# Patient Record
Sex: FEMALE | Race: WHITE | URBAN - METROPOLITAN AREA
[De-identification: names, ages, dates, MRNs, and addresses within clinical notes are randomized per-mention and may not be internally consistent; named-entity substitution may affect disease eponyms.]

---

## 2022-11-10 ENCOUNTER — OFFICE VISIT (OUTPATIENT)
Dept: HEMATOLOGY/ONCOLOGY | Facility: HOSPITAL | Age: 52
End: 2022-11-10
Attending: INTERNAL MEDICINE
Payer: COMMERCIAL

## 2022-11-10 VITALS
SYSTOLIC BLOOD PRESSURE: 135 MMHG | HEART RATE: 73 BPM | RESPIRATION RATE: 16 BRPM | DIASTOLIC BLOOD PRESSURE: 79 MMHG | OXYGEN SATURATION: 98 % | TEMPERATURE: 98 F | WEIGHT: 132.63 LBS

## 2022-11-10 DIAGNOSIS — R20.0 EXTREMITY NUMBNESS: ICD-10-CM

## 2022-11-10 DIAGNOSIS — E61.1 IRON DEFICIENCY: ICD-10-CM

## 2022-11-10 DIAGNOSIS — D69.6 THROMBOCYTOPENIA (HCC): ICD-10-CM

## 2022-11-10 DIAGNOSIS — D05.12 BREAST NEOPLASM, TIS (DCIS), LEFT: ICD-10-CM

## 2022-11-10 DIAGNOSIS — R42 DIZZINESS AFTER EXTENSION OF NECK: ICD-10-CM

## 2022-11-10 DIAGNOSIS — D70.8 OTHER NEUTROPENIA (HCC): Primary | ICD-10-CM

## 2022-11-10 DIAGNOSIS — R21 RASH IN ADULT: ICD-10-CM

## 2022-11-10 LAB
BASOPHILS # BLD AUTO: 0.04 X10(3) UL (ref 0–0.2)
BASOPHILS NFR BLD AUTO: 0.9 %
DEPRECATED HBV CORE AB SER IA-ACNC: 183.8 NG/ML
EOSINOPHIL # BLD AUTO: 0.11 X10(3) UL (ref 0–0.7)
EOSINOPHIL NFR BLD AUTO: 2.6 %
ERYTHROCYTE [DISTWIDTH] IN BLOOD BY AUTOMATED COUNT: 11.9 %
HCT VFR BLD AUTO: 39.8 %
HGB BLD-MCNC: 13.1 G/DL
IMM GRANULOCYTES # BLD AUTO: 0.01 X10(3) UL (ref 0–1)
IMM GRANULOCYTES NFR BLD: 0.2 %
LDH SERPL L TO P-CCNC: 148 U/L
LYMPHOCYTES # BLD AUTO: 1.08 X10(3) UL (ref 1–4)
LYMPHOCYTES NFR BLD AUTO: 25.4 %
MCH RBC QN AUTO: 32.5 PG (ref 26–34)
MCHC RBC AUTO-ENTMCNC: 32.9 G/DL (ref 31–37)
MCV RBC AUTO: 98.8 FL
MONOCYTES # BLD AUTO: 0.39 X10(3) UL (ref 0.1–1)
MONOCYTES NFR BLD AUTO: 9.2 %
NEUTROPHILS # BLD AUTO: 2.62 X10 (3) UL (ref 1.5–7.7)
NEUTROPHILS # BLD AUTO: 2.62 X10(3) UL (ref 1.5–7.7)
NEUTROPHILS NFR BLD AUTO: 61.7 %
PLATELET # BLD AUTO: 159.5 10(3)UL (ref 150–450)
PLATELET # BLD AUTO: 174 10(3)UL (ref 150–450)
RBC # BLD AUTO: 4.03 X10(6)UL
RHEUMATOID FACT SERPL-ACNC: <10 IU/ML (ref ?–15)
WBC # BLD AUTO: 4.3 X10(3) UL (ref 4–11)

## 2022-11-10 PROCEDURE — 84165 PROTEIN E-PHORESIS SERUM: CPT | Performed by: INTERNAL MEDICINE

## 2022-11-10 PROCEDURE — 99211 OFF/OP EST MAY X REQ PHY/QHP: CPT

## 2022-11-10 PROCEDURE — 83521 IG LIGHT CHAINS FREE EACH: CPT | Performed by: INTERNAL MEDICINE

## 2022-11-10 PROCEDURE — 83615 LACTATE (LD) (LDH) ENZYME: CPT | Performed by: INTERNAL MEDICINE

## 2022-11-10 PROCEDURE — 36415 COLL VENOUS BLD VENIPUNCTURE: CPT

## 2022-11-10 PROCEDURE — 86334 IMMUNOFIX E-PHORESIS SERUM: CPT | Performed by: INTERNAL MEDICINE

## 2022-11-10 PROCEDURE — 85025 COMPLETE CBC W/AUTO DIFF WBC: CPT | Performed by: INTERNAL MEDICINE

## 2022-11-10 PROCEDURE — 82525 ASSAY OF COPPER: CPT | Performed by: INTERNAL MEDICINE

## 2022-11-10 PROCEDURE — 86225 DNA ANTIBODY NATIVE: CPT | Performed by: INTERNAL MEDICINE

## 2022-11-10 PROCEDURE — 86038 ANTINUCLEAR ANTIBODIES: CPT | Performed by: INTERNAL MEDICINE

## 2022-11-10 PROCEDURE — 82728 ASSAY OF FERRITIN: CPT | Performed by: INTERNAL MEDICINE

## 2022-11-10 PROCEDURE — 86431 RHEUMATOID FACTOR QUANT: CPT | Performed by: INTERNAL MEDICINE

## 2022-11-10 PROCEDURE — 83921 ORGANIC ACID SINGLE QUANT: CPT | Performed by: INTERNAL MEDICINE

## 2022-11-10 RX ORDER — ALPRAZOLAM 0.5 MG/1
0.5 TABLET ORAL NIGHTLY PRN
COMMUNITY

## 2022-11-10 RX ORDER — FERROUS SULFATE TAB EC 324 MG (65 MG FE EQUIVALENT) 324 (65 FE) MG
TABLET DELAYED RESPONSE ORAL
COMMUNITY

## 2022-11-10 NOTE — PATIENT INSTRUCTIONS
For triage nurse: [phone] Monday through Friday 7:30-5:00.  *Please note this is a new phone number*    After hours or weekends for emergent needs:  [phone].      To schedule diagnostic testing: Central [phone]    For Medical Records: [phone]

## 2022-11-11 LAB
DSDNA IGG SERPL IA-ACNC: 2.2 IU/ML
ENA AB SER QL IA: <0.09 UG/L
ENA AB SER QL IA: NEGATIVE

## 2022-11-12 LAB — COPPER, SERUM: 110.3 UG/DL

## 2022-11-15 LAB
ALBUMIN SERPL ELPH-MCNC: 4.32 G/DL (ref 3.75–5.21)
ALBUMIN/GLOB SERPL: 1.89 {RATIO} (ref 1–2)
ALPHA1 GLOB SERPL ELPH-MCNC: 0.24 G/DL (ref 0.19–0.46)
ALPHA2 GLOB SERPL ELPH-MCNC: 0.65 G/DL (ref 0.48–1.05)
B-GLOBULIN SERPL ELPH-MCNC: 0.59 G/DL (ref 0.68–1.23)
GAMMA GLOB SERPL ELPH-MCNC: 0.79 G/DL (ref 0.62–1.7)
KAPPA LC FREE SER-MCNC: 1.64 MG/DL (ref 0.33–1.94)
KAPPA LC FREE/LAMBDA FREE SER NEPH: 1.36 {RATIO} (ref 0.26–1.65)
LAMBDA LC FREE SERPL-MCNC: 1.21 MG/DL (ref 0.57–2.63)
PROT SERPL-MCNC: 6.6 G/DL (ref 6.4–8.2)

## 2022-11-17 LAB — MMA: <0.1 UMOL/L

## 2023-01-19 ENCOUNTER — TELEPHONE (OUTPATIENT)
Dept: SURGERY | Facility: CLINIC | Age: 53
End: 2023-01-19

## 2023-01-19 NOTE — TELEPHONE ENCOUNTER
Spoke to patient regarding getting a consult with Alison Jamil. Patient is going to obtain all breast imaging and drop them off in office. Patient will be provided with appointment once records are received. Patient verbally understand.

## 2023-01-23 ENCOUNTER — OFFICE VISIT (OUTPATIENT)
Dept: FAMILY MEDICINE CLINIC | Facility: CLINIC | Age: 53
End: 2023-01-23
Payer: COMMERCIAL

## 2023-01-23 VITALS
WEIGHT: 136.19 LBS | DIASTOLIC BLOOD PRESSURE: 60 MMHG | OXYGEN SATURATION: 99 % | HEART RATE: 73 BPM | HEIGHT: 66.73 IN | SYSTOLIC BLOOD PRESSURE: 102 MMHG | RESPIRATION RATE: 16 BRPM | BODY MASS INDEX: 21.63 KG/M2

## 2023-01-23 DIAGNOSIS — R20.2 PARESTHESIAS IN RIGHT HAND: ICD-10-CM

## 2023-01-23 DIAGNOSIS — Z13.228 SCREENING FOR ENDOCRINE, NUTRITIONAL, METABOLIC AND IMMUNITY DISORDER: ICD-10-CM

## 2023-01-23 DIAGNOSIS — Z13.21 SCREENING FOR ENDOCRINE, NUTRITIONAL, METABOLIC AND IMMUNITY DISORDER: ICD-10-CM

## 2023-01-23 DIAGNOSIS — Z12.11 SCREENING FOR COLON CANCER: ICD-10-CM

## 2023-01-23 DIAGNOSIS — Z13.6 SCREENING FOR HEART DISEASE: ICD-10-CM

## 2023-01-23 DIAGNOSIS — Z13.29 SCREENING FOR ENDOCRINE, NUTRITIONAL, METABOLIC AND IMMUNITY DISORDER: ICD-10-CM

## 2023-01-23 DIAGNOSIS — R20.2 PARESTHESIA OF LEFT FOOT: ICD-10-CM

## 2023-01-23 DIAGNOSIS — Z00.00 ANNUAL PHYSICAL EXAM: Primary | ICD-10-CM

## 2023-01-23 DIAGNOSIS — D05.12 DUCTAL CARCINOMA IN SITU (DCIS) OF LEFT BREAST: ICD-10-CM

## 2023-01-23 DIAGNOSIS — F41.9 ANXIETY: ICD-10-CM

## 2023-01-23 DIAGNOSIS — Z13.0 SCREENING FOR ENDOCRINE, NUTRITIONAL, METABOLIC AND IMMUNITY DISORDER: ICD-10-CM

## 2023-01-23 DIAGNOSIS — D50.9 IRON DEFICIENCY ANEMIA, UNSPECIFIED IRON DEFICIENCY ANEMIA TYPE: ICD-10-CM

## 2023-01-23 PROBLEM — M51.9 LUMBAR DISC DISORDER: Status: ACTIVE | Noted: 2019-07-10

## 2023-01-23 PROBLEM — I87.303 IDIOPATHIC CHRONIC VENOUS HYPERTENSION OF BOTH LOWER EXTREMITIES WITHOUT COMPLICATIONS: Status: ACTIVE | Noted: 2019-07-10

## 2023-01-23 PROBLEM — R20.9 ABNORMAL SENSATION OF LEG: Status: ACTIVE | Noted: 2019-07-10

## 2023-01-23 PROCEDURE — 3008F BODY MASS INDEX DOCD: CPT | Performed by: FAMILY MEDICINE

## 2023-01-23 PROCEDURE — 99386 PREV VISIT NEW AGE 40-64: CPT | Performed by: FAMILY MEDICINE

## 2023-01-23 PROCEDURE — 3078F DIAST BP <80 MM HG: CPT | Performed by: FAMILY MEDICINE

## 2023-01-23 PROCEDURE — 3074F SYST BP LT 130 MM HG: CPT | Performed by: FAMILY MEDICINE

## 2023-01-23 RX ORDER — ALPRAZOLAM 0.25 MG/1
0.25 TABLET ORAL
COMMUNITY
End: 2023-01-23 | Stop reason: ALTCHOICE

## 2023-01-23 RX ORDER — OMEGA-3 FATTY ACIDS/FISH OIL 300-1000MG
200 CAPSULE ORAL EVERY 6 HOURS
COMMUNITY

## 2023-01-23 RX ORDER — PAROXETINE 10 MG/1
10 TABLET, FILM COATED ORAL EVERY MORNING
COMMUNITY
Start: 2022-07-15 | End: 2023-01-23 | Stop reason: ALTCHOICE

## 2023-01-23 RX ORDER — TRIAMCINOLONE ACETONIDE 1 MG/G
CREAM TOPICAL
COMMUNITY
Start: 2021-12-10 | End: 2023-01-23 | Stop reason: ALTCHOICE

## 2023-01-23 RX ORDER — ALPRAZOLAM 0.5 MG/1
0.5 TABLET ORAL
COMMUNITY
Start: 2022-07-15 | End: 2023-01-23 | Stop reason: ALTCHOICE

## 2023-01-23 RX ORDER — NITROFURANTOIN 25; 75 MG/1; MG/1
100 CAPSULE ORAL EVERY 12 HOURS
COMMUNITY
Start: 2022-09-25 | End: 2023-01-23 | Stop reason: ALTCHOICE

## 2023-01-23 RX ORDER — CLINDAMYCIN PHOSPHATE 10 UG/ML
60 LOTION TOPICAL 2 TIMES DAILY
COMMUNITY
Start: 2022-08-26 | End: 2023-01-23 | Stop reason: ALTCHOICE

## 2023-01-24 LAB
HPV: NOT DETECTED
THINPREP PAP: NEGATIVE

## 2023-01-25 ENCOUNTER — PATIENT MESSAGE (OUTPATIENT)
Dept: FAMILY MEDICINE CLINIC | Facility: CLINIC | Age: 53
End: 2023-01-25

## 2023-01-25 PROBLEM — D50.9 IRON DEFICIENCY ANEMIA: Status: ACTIVE | Noted: 2023-01-25

## 2023-01-25 PROBLEM — F41.9 ANXIETY: Status: ACTIVE | Noted: 2023-01-25

## 2023-01-25 PROBLEM — R20.9 ABNORMAL SENSATION OF LEG: Status: RESOLVED | Noted: 2019-07-10 | Resolved: 2023-01-25

## 2023-01-25 NOTE — TELEPHONE ENCOUNTER
From: Rashi Monique  To: Carmen Peña MD  Sent: 1/25/2023 8:09 AM CST  Subject: labs for physical    Hi, I was looking at my after visit summary to set up having my labs done, but it says \"done today. \" If I call for the lipid panel, etc. will they have an order in the system? We didn't do any labs at my appointment. Thanks.

## 2023-01-25 NOTE — TELEPHONE ENCOUNTER
From: Flora Montaño  Sent: 1/25/2023 12:25 PM CST  To: [facility] Clinical Staff  Subject: labs for physical    Thanks Marlee Michael. I also noticed that the notes say I have Paresthesia on the left foot, but it is my right foot.

## 2023-01-26 NOTE — TELEPHONE ENCOUNTER
From: Willow Chan  To: Magalis Kumari MD  Sent: 1/25/2023 8:09 AM CST  Subject: labs for physical    Hi, I was looking at my after visit summary to set up having my labs done, but it says \"done today. \" If I call for the lipid panel, etc. will they have an order in the system? We didn't do any labs at my appointment. Thanks.

## 2023-02-01 ENCOUNTER — E-VISIT (OUTPATIENT)
Dept: TELEHEALTH | Age: 53
End: 2023-02-01

## 2023-02-01 DIAGNOSIS — R39.9 UTI SYMPTOMS: Primary | ICD-10-CM

## 2023-02-01 LAB
ALBUMIN/GLOBULIN RATIO: 1.8 (CALC) (ref 1–2.5)
ALBUMIN: 4.4 G/DL (ref 3.6–5.1)
ALKALINE PHOSPHATASE: 77 U/L (ref 37–153)
ALT: 11 U/L (ref 6–29)
AST: 18 U/L (ref 10–35)
BILIRUBIN, TOTAL: 0.5 MG/DL (ref 0.2–1.2)
BUN: 16 MG/DL (ref 7–25)
CALCIUM: 10 MG/DL (ref 8.6–10.4)
CARBON DIOXIDE: 31 MMOL/L (ref 20–32)
CHLORIDE: 105 MMOL/L (ref 98–110)
CHOL/HDLC RATIO: 2.5 (CALC)
CHOLESTEROL, TOTAL: 217 MG/DL
CREATININE: 0.88 MG/DL (ref 0.5–1.03)
EGFR: 79 ML/MIN/1.73M2
GLOBULIN: 2.4 G/DL (CALC) (ref 1.9–3.7)
GLUCOSE: 90 MG/DL (ref 65–99)
HDL CHOLESTEROL: 86 MG/DL
LDL-CHOLESTEROL: 113 MG/DL (CALC)
NON-HDL CHOLESTEROL: 131 MG/DL (CALC)
POTASSIUM: 5 MMOL/L (ref 3.5–5.3)
PROTEIN, TOTAL: 6.8 G/DL (ref 6.1–8.1)
SODIUM: 142 MMOL/L (ref 135–146)
TRIGLYCERIDES: 84 MG/DL
TSH W/REFLEX TO FT4: 3.4 MIU/L

## 2023-02-01 PROCEDURE — 99421 OL DIG E/M SVC 5-10 MIN: CPT | Performed by: NURSE PRACTITIONER

## 2023-02-01 RX ORDER — NITROFURANTOIN 25; 75 MG/1; MG/1
CAPSULE ORAL
Qty: 14 CAPSULE | Refills: 0 | Status: SHIPPED
Start: 2023-02-01

## 2023-02-01 RX ORDER — PHENAZOPYRIDINE HYDROCHLORIDE 200 MG/1
TABLET, FILM COATED ORAL
Qty: 6 TABLET | Refills: 0 | Status: SHIPPED | OUTPATIENT
Start: 2023-02-01

## 2023-02-02 NOTE — PROGRESS NOTES
Refer to patient Questionnaire and responses. See MyChart messages. 10 minutes spent in direct communication with patient via [facility].

## 2023-02-24 ENCOUNTER — TELEPHONE (OUTPATIENT)
Dept: FAMILY MEDICINE CLINIC | Facility: CLINIC | Age: 53
End: 2023-02-24

## 2023-02-24 ENCOUNTER — OFFICE VISIT (OUTPATIENT)
Dept: FAMILY MEDICINE CLINIC | Facility: CLINIC | Age: 53
End: 2023-02-24
Payer: COMMERCIAL

## 2023-02-24 VITALS
WEIGHT: 132 LBS | SYSTOLIC BLOOD PRESSURE: 110 MMHG | OXYGEN SATURATION: 97 % | DIASTOLIC BLOOD PRESSURE: 70 MMHG | HEIGHT: 68 IN | HEART RATE: 75 BPM | BODY MASS INDEX: 20 KG/M2 | TEMPERATURE: 99 F | RESPIRATION RATE: 16 BRPM

## 2023-02-24 DIAGNOSIS — R39.9 UTI SYMPTOMS: Primary | ICD-10-CM

## 2023-02-24 LAB
APPEARANCE: CLEAR
BILIRUBIN: NEGATIVE
GLUCOSE (URINE DIPSTICK): NEGATIVE MG/DL
KETONES (URINE DIPSTICK): NEGATIVE MG/DL
LEUKOCYTES: NEGATIVE
MULTISTIX LOT#: ABNORMAL NUMERIC
NITRITE, URINE: NEGATIVE
PH, URINE: 6 (ref 4.5–8)
PROTEIN (URINE DIPSTICK): NEGATIVE MG/DL
SPECIFIC GRAVITY: 1.01 (ref 1–1.03)
URINE-COLOR: YELLOW
UROBILINOGEN,SEMI-QN: 0.2 MG/DL (ref 0–1.9)

## 2023-02-24 PROCEDURE — 99213 OFFICE O/P EST LOW 20 MIN: CPT | Performed by: NURSE PRACTITIONER

## 2023-02-24 PROCEDURE — 3074F SYST BP LT 130 MM HG: CPT | Performed by: NURSE PRACTITIONER

## 2023-02-24 PROCEDURE — 87086 URINE CULTURE/COLONY COUNT: CPT | Performed by: NURSE PRACTITIONER

## 2023-02-24 PROCEDURE — 3078F DIAST BP <80 MM HG: CPT | Performed by: NURSE PRACTITIONER

## 2023-02-24 PROCEDURE — 3008F BODY MASS INDEX DOCD: CPT | Performed by: NURSE PRACTITIONER

## 2023-02-24 PROCEDURE — 81003 URINALYSIS AUTO W/O SCOPE: CPT | Performed by: NURSE PRACTITIONER

## 2023-02-24 RX ORDER — CIPROFLOXACIN 500 MG/1
500 TABLET, FILM COATED ORAL 2 TIMES DAILY
Qty: 10 TABLET | Refills: 0 | Status: SHIPPED | OUTPATIENT
Start: 2023-02-24 | End: 2023-03-01

## 2023-02-24 NOTE — PATIENT INSTRUCTIONS
Please start the antibiotic as discussed. We will send a urine culture and advise you if a change is needed in your antibiotic  Wipe from front to back after using the bathroom every time. Consider wet wipes for cleaning. Change your pad frequently during your menses. If sexually active urinate before and after sexual intercourse and wipe front to back. Stay well hydrated, wear cotton underwear to decrease the spread of E. Coli to the urethra. Cranberry extract may help make bacteria less likely to live in the bladder. Consider supplements during illness or regularly if frequent infections occur. Please follow up with your primary care provider if not improved despite treatment. Seek immediate care for worsening symptoms.

## 2023-02-24 NOTE — TELEPHONE ENCOUNTER
Pt called c/o possible UTI. Pt would like to be evaluated for possible medications. Advised pt to go to walk-in clinic to be evaluated today, pt agreeable.

## 2023-03-04 ENCOUNTER — OFFICE VISIT (OUTPATIENT)
Dept: FAMILY MEDICINE CLINIC | Facility: CLINIC | Age: 53
End: 2023-03-04
Payer: COMMERCIAL

## 2023-03-04 VITALS
OXYGEN SATURATION: 98 % | RESPIRATION RATE: 18 BRPM | HEIGHT: 68 IN | DIASTOLIC BLOOD PRESSURE: 82 MMHG | HEART RATE: 71 BPM | BODY MASS INDEX: 20 KG/M2 | SYSTOLIC BLOOD PRESSURE: 132 MMHG | TEMPERATURE: 97 F | WEIGHT: 132 LBS

## 2023-03-04 DIAGNOSIS — R39.9 UTI SYMPTOMS: Primary | ICD-10-CM

## 2023-03-04 LAB
APPEARANCE: CLEAR
BILIRUBIN: NEGATIVE
GLUCOSE (URINE DIPSTICK): NEGATIVE MG/DL
KETONES (URINE DIPSTICK): NEGATIVE MG/DL
MULTISTIX LOT#: ABNORMAL NUMERIC
NITRITE, URINE: NEGATIVE
PH, URINE: 7 (ref 4.5–8)
PROTEIN (URINE DIPSTICK): NEGATIVE MG/DL
SPECIFIC GRAVITY: 1.02 (ref 1–1.03)
URINE-COLOR: YELLOW
UROBILINOGEN,SEMI-QN: 0.2 MG/DL (ref 0–1.9)

## 2023-03-04 PROCEDURE — 87086 URINE CULTURE/COLONY COUNT: CPT | Performed by: NURSE PRACTITIONER

## 2023-03-04 RX ORDER — CEPHALEXIN 500 MG/1
500 CAPSULE ORAL 2 TIMES DAILY
Qty: 10 CAPSULE | Refills: 0 | Status: SHIPPED | OUTPATIENT
Start: 2023-03-04

## 2023-03-04 NOTE — PATIENT INSTRUCTIONS
Complete full course of antibiotics. Urine culture sent to lab  Over the counter Tylenol/Motrin as needed for pain/discomfort. Follow up in 2-3 days if symptoms do not improve or worsen. Return to clinic or see your primary care provider immediately if you experience nausea, vomiting, or fever. What can you do to help prevent bladder infections? Urinate often during the day. You should also urinate after you have sex. If you are a woman, it is important to:   Keep the area around your vagina clean. Wipe from front to back after you go to the bathroom. Gently wash the area around your vagina when you bathe or shower. Wear cotton underwear. Use pantyhose with cotton crotches. Avoid tight clothing. Wear loose pants. Do not wear a wet bathing suit for a long time.

## 2023-03-07 ENCOUNTER — TELEPHONE (OUTPATIENT)
Dept: FAMILY MEDICINE CLINIC | Facility: CLINIC | Age: 53
End: 2023-03-07

## 2023-03-07 NOTE — TELEPHONE ENCOUNTER
Pt c/o UTI symptoms. She has gone to walk-in clinic twice for frequency with urination and lower abdominal pain - cultures came back negative. Pt is taking Abx which she says is helping with symptoms, but then symptoms come back after the course is completed. Pt would like a sooner appointment with Dr. Marcelle Newby. Pt placed on waitlist for sooner appointment.

## 2023-03-08 ENCOUNTER — OFFICE VISIT (OUTPATIENT)
Dept: FAMILY MEDICINE CLINIC | Facility: CLINIC | Age: 53
End: 2023-03-08
Payer: COMMERCIAL

## 2023-03-08 VITALS
RESPIRATION RATE: 16 BRPM | SYSTOLIC BLOOD PRESSURE: 124 MMHG | BODY MASS INDEX: 20.97 KG/M2 | WEIGHT: 138.38 LBS | HEIGHT: 68 IN | HEART RATE: 77 BPM | DIASTOLIC BLOOD PRESSURE: 70 MMHG | OXYGEN SATURATION: 98 %

## 2023-03-08 DIAGNOSIS — N30.01 ACUTE CYSTITIS WITH HEMATURIA: Primary | ICD-10-CM

## 2023-03-08 DIAGNOSIS — R30.0 DYSURIA: ICD-10-CM

## 2023-03-08 LAB
APPEARANCE: CLEAR
BILIRUBIN: NEGATIVE
GLUCOSE (URINE DIPSTICK): NEGATIVE MG/DL
KETONES (URINE DIPSTICK): NEGATIVE MG/DL
LEUKOCYTES: NEGATIVE
MULTISTIX LOT#: ABNORMAL NUMERIC
NITRITE, URINE: NEGATIVE
PH, URINE: 6.5 (ref 4.5–8)
PROTEIN (URINE DIPSTICK): NEGATIVE MG/DL
SPECIFIC GRAVITY: 1.01 (ref 1–1.03)
URINE-COLOR: YELLOW
UROBILINOGEN,SEMI-QN: 0.2 MG/DL (ref 0–1.9)

## 2023-03-08 PROCEDURE — 3078F DIAST BP <80 MM HG: CPT | Performed by: FAMILY MEDICINE

## 2023-03-08 PROCEDURE — 87086 URINE CULTURE/COLONY COUNT: CPT | Performed by: FAMILY MEDICINE

## 2023-03-08 PROCEDURE — 81003 URINALYSIS AUTO W/O SCOPE: CPT | Performed by: FAMILY MEDICINE

## 2023-03-08 PROCEDURE — 99213 OFFICE O/P EST LOW 20 MIN: CPT | Performed by: FAMILY MEDICINE

## 2023-03-08 PROCEDURE — 3074F SYST BP LT 130 MM HG: CPT | Performed by: FAMILY MEDICINE

## 2023-03-08 PROCEDURE — 3008F BODY MASS INDEX DOCD: CPT | Performed by: FAMILY MEDICINE

## 2023-03-09 ENCOUNTER — TELEPHONE (OUTPATIENT)
Dept: UROLOGY | Facility: CLINIC | Age: 53
End: 2023-03-09

## 2023-05-03 ENCOUNTER — OFFICE VISIT (OUTPATIENT)
Dept: UROLOGY | Facility: CLINIC | Age: 53
End: 2023-05-03
Attending: OBSTETRICS & GYNECOLOGY
Payer: COMMERCIAL

## 2023-05-03 VITALS — WEIGHT: 138 LBS | HEIGHT: 68 IN | BODY MASS INDEX: 20.92 KG/M2 | TEMPERATURE: 98 F

## 2023-05-03 DIAGNOSIS — Z87.440 HISTORY OF RECURRENT UTIS: ICD-10-CM

## 2023-05-03 DIAGNOSIS — R35.0 URINARY FREQUENCY: Primary | ICD-10-CM

## 2023-05-03 LAB
BILIRUB UR QL STRIP.AUTO: NEGATIVE
CLARITY UR REFRACT.AUTO: CLEAR
COLOR UR AUTO: YELLOW
CONTROL RUN WITHIN 24 HOURS?: YES
GLUCOSE UR STRIP.AUTO-MCNC: NEGATIVE MG/DL
KETONES UR STRIP.AUTO-MCNC: NEGATIVE MG/DL
LEUKOCYTE ESTERASE UR QL STRIP.AUTO: NEGATIVE
LEUKOCYTE ESTERASE URINE: NEGATIVE
NITRITE UR QL STRIP.AUTO: POSITIVE
NITRITE URINE: NEGATIVE
PH UR STRIP.AUTO: 8 [PH] (ref 5–8)
PROT UR STRIP.AUTO-MCNC: NEGATIVE MG/DL
SP GR UR STRIP.AUTO: 1 (ref 1–1.03)
UROBILINOGEN UR STRIP.AUTO-MCNC: <2 MG/DL

## 2023-05-03 PROCEDURE — 81001 URINALYSIS AUTO W/SCOPE: CPT | Performed by: OBSTETRICS & GYNECOLOGY

## 2023-05-03 PROCEDURE — 87077 CULTURE AEROBIC IDENTIFY: CPT | Performed by: OBSTETRICS & GYNECOLOGY

## 2023-05-03 PROCEDURE — 87086 URINE CULTURE/COLONY COUNT: CPT | Performed by: OBSTETRICS & GYNECOLOGY

## 2023-05-03 PROCEDURE — 87186 SC STD MICRODIL/AGAR DIL: CPT | Performed by: OBSTETRICS & GYNECOLOGY

## 2023-05-03 PROCEDURE — 51798 US URINE CAPACITY MEASURE: CPT | Performed by: OBSTETRICS & GYNECOLOGY

## 2023-05-03 PROCEDURE — 99212 OFFICE O/P EST SF 10 MIN: CPT

## 2023-05-03 PROCEDURE — 81002 URINALYSIS NONAUTO W/O SCOPE: CPT | Performed by: OBSTETRICS & GYNECOLOGY

## 2023-05-03 RX ORDER — IBUPROFEN 200 MG
400 TABLET ORAL EVERY 6 HOURS PRN
COMMUNITY

## 2023-05-03 NOTE — PROCEDURES
[facility] Pelvic Medicine  Bladder Scanner Note    Date:  5/3/2023    Patient Name:  Sue Olivera  Patient :  1970   Patient MRN:  [id number]  Patient Age:  47year old      Diagnosis:  Post Void Residual    Procedure:  [address] Bladder Scanner    Physician:  Dr. Coley Kussmaul    RN:  Enriqueta Calzada RN     Bladder scanned per procedure with a residual amount of 4ml. Dr. Cely Titus informed.

## 2023-05-08 ENCOUNTER — TELEPHONE (OUTPATIENT)
Dept: UROLOGY | Facility: CLINIC | Age: 53
End: 2023-05-08

## 2023-05-08 RX ORDER — NITROFURANTOIN 25; 75 MG/1; MG/1
100 CAPSULE ORAL 2 TIMES DAILY
Qty: 14 CAPSULE | Refills: 0 | Status: SHIPPED | OUTPATIENT
Start: 2023-05-08

## 2023-05-24 ENCOUNTER — TELEPHONE (OUTPATIENT)
Dept: UROLOGY | Facility: CLINIC | Age: 53
End: 2023-05-24

## 2023-05-24 DIAGNOSIS — R30.0 DYSURIA: Primary | ICD-10-CM

## 2023-05-24 NOTE — TELEPHONE ENCOUNTER
Return call to patient, reviewed with Dr Jimmy Winkler urine culture test of cure  Pt voices understanding

## 2023-05-24 NOTE — TELEPHONE ENCOUNTER
Pt recently tx for +UTI >100k e coli 5/8/23 NTF 100mg po bid x 7 days  Pt states she feels a \"tingle\" when she bends and at the beginning of urination and wonders if it's the same uti she's had all month.     Will consult with Dr Connie Del Angel to determine next step

## 2023-05-26 ENCOUNTER — LAB ENCOUNTER (OUTPATIENT)
Dept: LAB | Age: 53
End: 2023-05-26
Attending: OBSTETRICS & GYNECOLOGY
Payer: COMMERCIAL

## 2023-05-26 DIAGNOSIS — R30.0 DYSURIA: ICD-10-CM

## 2023-05-26 PROCEDURE — 87086 URINE CULTURE/COLONY COUNT: CPT

## 2023-06-02 ENCOUNTER — OFFICE VISIT (OUTPATIENT)
Dept: SURGERY | Facility: CLINIC | Age: 53
End: 2023-06-02
Payer: COMMERCIAL

## 2023-06-02 VITALS
HEART RATE: 64 BPM | TEMPERATURE: 98 F | OXYGEN SATURATION: 100 % | HEIGHT: 68 IN | RESPIRATION RATE: 16 BRPM | BODY MASS INDEX: 20.56 KG/M2 | SYSTOLIC BLOOD PRESSURE: 121 MMHG | DIASTOLIC BLOOD PRESSURE: 79 MMHG | WEIGHT: 135.63 LBS

## 2023-06-02 DIAGNOSIS — D05.12 BREAST NEOPLASM, TIS (DCIS), LEFT: Primary | ICD-10-CM

## 2023-06-02 DIAGNOSIS — R92.8 ABNORMAL MRI, BREAST: ICD-10-CM

## 2023-06-02 PROCEDURE — 3078F DIAST BP <80 MM HG: CPT | Performed by: SURGERY

## 2023-06-02 PROCEDURE — 3008F BODY MASS INDEX DOCD: CPT | Performed by: SURGERY

## 2023-06-02 PROCEDURE — 99204 OFFICE O/P NEW MOD 45 MIN: CPT | Performed by: SURGERY

## 2023-06-02 PROCEDURE — 3074F SYST BP LT 130 MM HG: CPT | Performed by: SURGERY

## 2023-06-04 PROBLEM — D05.12 BREAST NEOPLASM, TIS (DCIS), LEFT: Status: ACTIVE | Noted: 2023-06-04

## 2023-06-14 ENCOUNTER — OFFICE VISIT (OUTPATIENT)
Dept: NEUROLOGY | Facility: CLINIC | Age: 53
End: 2023-06-14
Payer: COMMERCIAL

## 2023-06-14 VITALS
SYSTOLIC BLOOD PRESSURE: 111 MMHG | DIASTOLIC BLOOD PRESSURE: 64 MMHG | RESPIRATION RATE: 16 BRPM | BODY MASS INDEX: 21 KG/M2 | HEART RATE: 69 BPM | WEIGHT: 135.38 LBS

## 2023-06-14 DIAGNOSIS — R20.2 PARESTHESIAS: Primary | ICD-10-CM

## 2023-06-14 PROCEDURE — 3074F SYST BP LT 130 MM HG: CPT | Performed by: OTHER

## 2023-06-14 PROCEDURE — 3078F DIAST BP <80 MM HG: CPT | Performed by: OTHER

## 2023-06-14 PROCEDURE — 99204 OFFICE O/P NEW MOD 45 MIN: CPT | Performed by: OTHER

## 2023-06-14 RX ORDER — POLYETHYLENE GLYCOL 3350, SODIUM CHLORIDE, SODIUM BICARBONATE, POTASSIUM CHLORIDE 420; 11.2; 5.72; 1.48 G/4L; G/4L; G/4L; G/4L
4000 POWDER, FOR SOLUTION ORAL AS DIRECTED
COMMUNITY
Start: 2023-05-03

## 2023-06-14 NOTE — PROGRESS NOTES
Pt reports she has numbess in right hand and right foot. Pt states numbness in foot started in 2018. She notes \"hot flashes\" that go down lower leg. Numbness present always, hot flashes occ. Pt states intermittent hand numbness (achy feeling) started one year ago.

## 2023-06-26 ENCOUNTER — HOSPITAL ENCOUNTER (OUTPATIENT)
Dept: MRI IMAGING | Facility: HOSPITAL | Age: 53
Discharge: HOME | End: 2023-06-26
Attending: SURGERY
Payer: COMMERCIAL

## 2023-06-26 DIAGNOSIS — R92.8 ABNORMAL MRI, BREAST: ICD-10-CM

## 2023-06-26 DIAGNOSIS — D05.12 BREAST NEOPLASM, TIS (DCIS), LEFT: ICD-10-CM

## 2023-06-26 PROCEDURE — A9575 INJ GADOTERATE MEGLUMI 0.1ML: HCPCS | Performed by: SURGERY

## 2023-06-26 PROCEDURE — 77049 MRI BREAST C-+ W/CAD BI: CPT | Performed by: SURGERY

## 2023-06-26 RX ORDER — GADOTERATE MEGLUMINE 376.9 MG/ML
15 INJECTION INTRAVENOUS
Status: COMPLETED | OUTPATIENT
Start: 2023-06-26 | End: 2023-06-26

## 2023-06-26 RX ADMIN — GADOTERATE MEGLUMINE 12 ML: 376.9 INJECTION INTRAVENOUS at 18:28:00

## 2023-07-03 DIAGNOSIS — D05.12 BREAST NEOPLASM, TIS (DCIS), LEFT: Primary | ICD-10-CM

## 2023-08-09 ENCOUNTER — OFFICE VISIT (OUTPATIENT)
Dept: UROLOGY | Facility: CLINIC | Age: 53
End: 2023-08-09
Attending: OBSTETRICS & GYNECOLOGY
Payer: COMMERCIAL

## 2023-08-09 VITALS — BODY MASS INDEX: 20.46 KG/M2 | RESPIRATION RATE: 18 BRPM | WEIGHT: 135 LBS | HEIGHT: 68 IN | TEMPERATURE: 98 F

## 2023-08-09 DIAGNOSIS — R35.0 URINARY FREQUENCY: ICD-10-CM

## 2023-08-09 DIAGNOSIS — Z87.440 HISTORY OF RECURRENT UTIS: Primary | ICD-10-CM

## 2023-08-09 LAB
BILIRUB UR QL STRIP.AUTO: NEGATIVE
CLARITY UR REFRACT.AUTO: CLEAR
GLUCOSE UR STRIP.AUTO-MCNC: NEGATIVE MG/DL
KETONES UR STRIP.AUTO-MCNC: NEGATIVE MG/DL
LEUKOCYTE ESTERASE UR QL STRIP.AUTO: NEGATIVE
NITRITE UR QL STRIP.AUTO: NEGATIVE
PH UR STRIP.AUTO: 6 [PH] (ref 5–8)
PROT UR STRIP.AUTO-MCNC: NEGATIVE MG/DL
SP GR UR STRIP.AUTO: 1.01 (ref 1–1.03)
UROBILINOGEN UR STRIP.AUTO-MCNC: <2 MG/DL

## 2023-08-09 PROCEDURE — 99212 OFFICE O/P EST SF 10 MIN: CPT

## 2023-08-09 PROCEDURE — 87086 URINE CULTURE/COLONY COUNT: CPT | Performed by: OBSTETRICS & GYNECOLOGY

## 2023-08-09 PROCEDURE — 81001 URINALYSIS AUTO W/SCOPE: CPT | Performed by: OBSTETRICS & GYNECOLOGY

## 2023-08-11 ENCOUNTER — TELEPHONE (OUTPATIENT)
Dept: UROLOGY | Facility: CLINIC | Age: 53
End: 2023-08-11

## 2023-08-11 DIAGNOSIS — R31.29 MICROSCOPIC HEMATURIA: Primary | ICD-10-CM

## 2023-08-11 NOTE — TELEPHONE ENCOUNTER
TC to patient to inform of urinalysis and ucx results.  reviewed: Urine culture is negative but urine + 3-5 RBC's. Recommend renal US and office cystoscopy. Pt aware, renal ultrasound ordered at [facility] location. Office cysto to be schedule by , they are aware. Answered all questions. Pt understands and agrees w/plan.

## 2023-08-14 ENCOUNTER — HOSPITAL ENCOUNTER (OUTPATIENT)
Dept: ULTRASOUND IMAGING | Age: 53
Discharge: HOME | End: 2023-08-14
Attending: OBSTETRICS & GYNECOLOGY
Payer: COMMERCIAL

## 2023-08-14 DIAGNOSIS — R31.29 MICROSCOPIC HEMATURIA: ICD-10-CM

## 2023-08-14 PROCEDURE — 76775 US EXAM ABDO BACK WALL LIM: CPT | Performed by: OBSTETRICS & GYNECOLOGY

## 2023-08-29 PROBLEM — Z12.11 SPECIAL SCREENING FOR MALIGNANT NEOPLASM OF COLON: Status: ACTIVE | Noted: 2023-08-29

## 2023-09-14 ENCOUNTER — PROCEDURE VISIT (OUTPATIENT)
Dept: NEUROLOGY | Facility: CLINIC | Age: 53
End: 2023-09-14
Payer: COMMERCIAL

## 2023-09-14 DIAGNOSIS — R20.2 PARESTHESIAS: Primary | ICD-10-CM

## 2023-09-14 PROCEDURE — 95912 NRV CNDJ TEST 11-12 STUDIES: CPT | Performed by: OTHER

## 2023-09-14 PROCEDURE — 95886 MUSC TEST DONE W/N TEST COMP: CPT | Performed by: OTHER

## 2023-09-21 RX ORDER — ALPRAZOLAM 0.5 MG/1
0.5 TABLET ORAL NIGHTLY PRN
Refills: 0 | OUTPATIENT
Start: 2023-09-21

## 2023-09-21 NOTE — TELEPHONE ENCOUNTER
Refill no protocol available:     Pt requesting refill of   Requested Prescriptions     Pending Prescriptions Disp Refills    ALPRAZolam 0.5 MG Oral Tab  0     Sig: Take 1 tablet (0.5 mg total) by mouth nightly as needed. Sent to Provider for review:    Last Time Medication was Filled:  Pt reported Medication    Last Office Visit with Provider: 3/8/2023    No future appointments. Patient comment: .5 MG originally prescribed by my doctor in [facility].   30 pills lasted me over a year, but would like a refill for occasional nighttime anxiety

## 2023-09-21 NOTE — TELEPHONE ENCOUNTER
Pt needs appt for refill of Alprazolam (controlled substance). I have not seen her since 1/23/23. Please schedule. Thanks.

## 2023-10-23 ENCOUNTER — HOSPITAL ENCOUNTER (OUTPATIENT)
Dept: MAMMOGRAPHY | Facility: HOSPITAL | Age: 53
Discharge: HOME | End: 2023-10-23

## 2023-10-23 DIAGNOSIS — D05.12 BREAST NEOPLASM, TIS (DCIS), LEFT: ICD-10-CM

## 2023-10-23 PROCEDURE — 77066 DX MAMMO INCL CAD BI: CPT

## 2023-10-23 PROCEDURE — 77062 BREAST TOMOSYNTHESIS BI: CPT

## 2023-10-30 ENCOUNTER — TELEPHONE (OUTPATIENT)
Dept: UROLOGY | Facility: CLINIC | Age: 53
End: 2023-10-30

## 2023-10-30 NOTE — TELEPHONE ENCOUNTER
Pt calls to confirm cysto later this week is approved by insurance. O approval was obtained and good 11-1-23 through 1-30-23. Pt wanted to confirm that we do not need another UA before testing. Denies current UTI sx. Explained we do not need UA at this time. Renal ultrasound results on file. Reviewed instructions for cysto. Pt declined the pre-estimate codes for insurance.   Continue with plan for cysto as scheduled 11-1-23

## 2023-11-01 ENCOUNTER — OFFICE VISIT (OUTPATIENT)
Dept: UROLOGY | Facility: CLINIC | Age: 53
End: 2023-11-01
Attending: OBSTETRICS & GYNECOLOGY
Payer: COMMERCIAL

## 2023-11-01 VITALS
SYSTOLIC BLOOD PRESSURE: 130 MMHG | BODY MASS INDEX: 20.46 KG/M2 | DIASTOLIC BLOOD PRESSURE: 80 MMHG | HEIGHT: 68 IN | WEIGHT: 135 LBS

## 2023-11-01 DIAGNOSIS — R31.29 MICROSCOPIC HEMATURIA: Primary | ICD-10-CM

## 2023-11-01 LAB
CONTROL RUN WITHIN 24 HOURS?: YES
LEUKOCYTE ESTERASE URINE: NEGATIVE
NITRITE URINE: NEGATIVE

## 2023-11-01 PROCEDURE — 88108 CYTOPATH CONCENTRATE TECH: CPT | Performed by: OBSTETRICS & GYNECOLOGY

## 2023-11-01 PROCEDURE — 99212 OFFICE O/P EST SF 10 MIN: CPT

## 2023-11-01 PROCEDURE — 52000 CYSTOURETHROSCOPY: CPT

## 2023-11-01 PROCEDURE — 81002 URINALYSIS NONAUTO W/O SCOPE: CPT | Performed by: OBSTETRICS & GYNECOLOGY

## 2023-11-01 RX ORDER — LIDOCAINE HYDROCHLORIDE 20 MG/ML
10 JELLY TOPICAL ONCE
Status: COMPLETED | OUTPATIENT
Start: 2023-11-01 | End: 2023-11-01

## 2023-11-01 RX ADMIN — LIDOCAINE HYDROCHLORIDE 10 ML: 20 JELLY TOPICAL at 12:01:00

## 2023-11-01 NOTE — PATIENT INSTRUCTIONS
[facility]  [address] [address]  [address]: [phone]  FAX: [phone]     CYSTOSCOPY INSTRUCTIONS    What is a cystoscopy? An examination of the inside of the bladder and urethra, the tube that carries urine from the bladder to the outside of the body. How is it done? The doctor will place a long, thin instrument that has an eyepiece on one end and a light on the other into the bladder to examine the lining of the bladder. Why do I need this test?  The doctor may need to perform this procedure to find the cause of many urinary problems such as:  Frequent UTI's  Urinary frequency and urgency  Painful urination, chronic pelvic pain, of IC/Painful Bladder Syndrome  Urinary blockage or retention  Blood in urine (Hematuria)  Abnormal cells in urine    How do I prepare for this test?  You should eat and drink normally before this test and try to arrive with a \"comfortable full\" bladder. The test should be uncomfortable but not excessively painful. Arrive approximately fifteen minutes before your test and register at the . How is a cystoscopy performed? The nurse will have you get undressed and you will be asked to lie down on an exam table. Local medication may be applied to take away sensation as the scope is inserted by the doctor. The physician will gently insert the tube to look inside your bladder while a sterile water solution will run into the bladder during the exam.  You might feel an urge to urinate. When the test is done, you will be allowed to get up and urinate in the bathroom. How long does the test take? Most procedures are about 15-30 minutes in length. What do I do afterwards? You may have a mild burning feeling when you urinate afterwards. Some patients may have a small amount of blood when they wipe. These symptoms should not last more than 24 hours.   Your doctor will discuss findings with you after testing is complete. For questions or concerns, please call [phone].

## 2023-11-03 ENCOUNTER — TELEPHONE (OUTPATIENT)
Dept: UROLOGY | Facility: CLINIC | Age: 53
End: 2023-11-03

## 2023-11-03 LAB — NON GYNE INTERPRETATION: NEGATIVE

## 2023-11-03 NOTE — TELEPHONE ENCOUNTER
TC from pt saying Dr Sidhu Forget told her that her bladder wash testing would be back the next day. Pt upset she hasn't heard anything. TC back to pt to say pathology results typically will take 5-7 days, at times can take up to 2 wks. Pt verbalizes an understanding.

## 2023-12-01 ENCOUNTER — OFFICE VISIT (OUTPATIENT)
Dept: SURGERY | Facility: CLINIC | Age: 53
End: 2023-12-01
Payer: COMMERCIAL

## 2023-12-01 VITALS
DIASTOLIC BLOOD PRESSURE: 76 MMHG | OXYGEN SATURATION: 100 % | RESPIRATION RATE: 16 BRPM | TEMPERATURE: 97 F | HEIGHT: 68 IN | BODY MASS INDEX: 21.04 KG/M2 | HEART RATE: 60 BPM | WEIGHT: 138.81 LBS | SYSTOLIC BLOOD PRESSURE: 123 MMHG

## 2023-12-01 DIAGNOSIS — D05.12 BREAST NEOPLASM, TIS (DCIS), LEFT: Primary | ICD-10-CM

## 2024-01-29 ENCOUNTER — OFFICE VISIT (OUTPATIENT)
Dept: FAMILY MEDICINE CLINIC | Facility: CLINIC | Age: 54
End: 2024-01-29
Payer: COMMERCIAL

## 2024-01-29 VITALS
RESPIRATION RATE: 16 BRPM | SYSTOLIC BLOOD PRESSURE: 110 MMHG | DIASTOLIC BLOOD PRESSURE: 74 MMHG | TEMPERATURE: 98 F | WEIGHT: 136.81 LBS | HEART RATE: 73 BPM | BODY MASS INDEX: 21.22 KG/M2 | HEIGHT: 67.37 IN | OXYGEN SATURATION: 100 %

## 2024-01-29 DIAGNOSIS — Z13.29 SCREENING FOR ENDOCRINE, METABOLIC, AND IMMUNITY DISORDER: ICD-10-CM

## 2024-01-29 DIAGNOSIS — M25.541 ARTHRALGIA OF BOTH HANDS: ICD-10-CM

## 2024-01-29 DIAGNOSIS — D05.12 DUCTAL CARCINOMA IN SITU (DCIS) OF LEFT BREAST: ICD-10-CM

## 2024-01-29 DIAGNOSIS — Z13.6 SCREENING FOR CARDIOVASCULAR CONDITION: ICD-10-CM

## 2024-01-29 DIAGNOSIS — Z00.00 ANNUAL PHYSICAL EXAM: Primary | ICD-10-CM

## 2024-01-29 DIAGNOSIS — Z13.0 SCREENING FOR ENDOCRINE, METABOLIC, AND IMMUNITY DISORDER: ICD-10-CM

## 2024-01-29 DIAGNOSIS — D50.9 IRON DEFICIENCY ANEMIA, UNSPECIFIED IRON DEFICIENCY ANEMIA TYPE: ICD-10-CM

## 2024-01-29 DIAGNOSIS — Z13.228 SCREENING FOR ENDOCRINE, METABOLIC, AND IMMUNITY DISORDER: ICD-10-CM

## 2024-01-29 DIAGNOSIS — Z12.4 CERVICAL CANCER SCREENING: ICD-10-CM

## 2024-01-29 DIAGNOSIS — M25.542 ARTHRALGIA OF BOTH HANDS: ICD-10-CM

## 2024-01-29 DIAGNOSIS — R10.84 GENERALIZED ABDOMINAL PAIN: ICD-10-CM

## 2024-01-29 PROBLEM — M79.604 PAIN OF RIGHT LOWER EXTREMITY: Status: ACTIVE | Noted: 2023-04-13

## 2024-01-29 PROBLEM — I83.93 VARICOSE VEINS OF BOTH LOWER EXTREMITIES: Status: ACTIVE | Noted: 2023-04-13

## 2024-01-29 PROCEDURE — 88175 CYTOPATH C/V AUTO FLUID REDO: CPT | Performed by: FAMILY MEDICINE

## 2024-01-29 PROCEDURE — 87624 HPV HI-RISK TYP POOLED RSLT: CPT | Performed by: FAMILY MEDICINE

## 2024-01-29 PROCEDURE — 99396 PREV VISIT EST AGE 40-64: CPT | Performed by: FAMILY MEDICINE

## 2024-01-30 LAB — HPV I/H RISK 1 DNA SPEC QL NAA+PROBE: NEGATIVE

## 2024-02-01 LAB
.: NORMAL
.: NORMAL

## 2024-02-02 PROBLEM — R35.0 URINARY FREQUENCY: Status: RESOLVED | Noted: 2023-05-03 | Resolved: 2024-02-02

## 2024-02-02 PROBLEM — Z12.11 SPECIAL SCREENING FOR MALIGNANT NEOPLASM OF COLON: Status: RESOLVED | Noted: 2023-08-29 | Resolved: 2024-02-02

## 2024-02-03 LAB
ABSOLUTE BASOPHILS: 31 CELLS/UL (ref 0–200)
ABSOLUTE EOSINOPHILS: 158 CELLS/UL (ref 15–500)
ABSOLUTE LYMPHOCYTES: 1277 CELLS/UL (ref 850–3900)
ABSOLUTE MONOCYTES: 378 CELLS/UL (ref 200–950)
ABSOLUTE NEUTROPHILS: 1256 CELLS/UL (ref 1500–7800)
ALBUMIN/GLOBULIN RATIO: 2.1 (CALC) (ref 1–2.5)
ALBUMIN: 4.4 G/DL (ref 3.6–5.1)
ALKALINE PHOSPHATASE: 89 U/L (ref 37–153)
ALT: 11 U/L (ref 6–29)
AST: 20 U/L (ref 10–35)
BASOPHILS: 1 %
BILIRUBIN, TOTAL: 0.5 MG/DL (ref 0.2–1.2)
BUN: 21 MG/DL (ref 7–25)
CALCIUM: 9.9 MG/DL (ref 8.6–10.4)
CARBON DIOXIDE: 30 MMOL/L (ref 20–32)
CHLORIDE: 103 MMOL/L (ref 98–110)
CHOL/HDLC RATIO: 2.7 (CALC)
CHOLESTEROL, TOTAL: 215 MG/DL
CREATININE: 0.9 MG/DL (ref 0.5–1.03)
EGFR: 76 ML/MIN/1.73M2
EOSINOPHILS: 5.1 %
GLOBULIN: 2.1 G/DL (CALC) (ref 1.9–3.7)
GLUCOSE: 86 MG/DL (ref 65–99)
HDL CHOLESTEROL: 80 MG/DL
HEMATOCRIT: 38.3 % (ref 35–45)
HEMOGLOBIN: 12.5 G/DL (ref 11.7–15.5)
LDL-CHOLESTEROL: 119 MG/DL (CALC)
LYMPHOCYTES: 41.2 %
MCH: 30.8 PG (ref 27–33)
MCHC: 32.6 G/DL (ref 32–36)
MCV: 94.3 FL (ref 80–100)
MONOCYTES: 12.2 %
NEUTROPHILS: 40.5 %
NON-HDL CHOLESTEROL: 135 MG/DL (CALC)
POTASSIUM: 4.6 MMOL/L (ref 3.5–5.3)
PROTEIN, TOTAL: 6.5 G/DL (ref 6.1–8.1)
RDW: 11.7 % (ref 11–15)
RED BLOOD CELL COUNT: 4.06 MILLION/UL (ref 3.8–5.1)
SODIUM: 139 MMOL/L (ref 135–146)
TRIGLYCERIDES: 68 MG/DL
TSH W/REFLEX TO FT4: 4.19 MIU/L
WHITE BLOOD CELL COUNT: 3.1 THOUSAND/UL (ref 3.8–10.8)

## 2024-02-03 NOTE — PROGRESS NOTES
Chief Complaint   Patient presents with    Physical     Annual exam with pap exam          HPI:   Shantal Castro is a 53 year old female who presents for a complete physical with gyne exam.   Patient feels well, dental visit up to date, no hearing problem.  Vaccinations up to date.    LMP: post-menopausal  Sexual activity:monogamy   Contraception: post-menopausal  Exercise:  4x/weel walks 1/2 mi, alternating with running 1/2 mi .  Diet:  good    Wt Readings from Last 3 Encounters:   01/29/24 136 lb 12.8 oz (62.1 kg)   12/01/23 138 lb 12.8 oz (63 kg)   11/01/23 135 lb (61.2 kg)      BP Readings from Last 3 Encounters:   01/29/24 110/74   12/01/23 123/76   11/01/23 130/80     No LMP recorded. (Menstrual status: Menopause).     Annual physical:  Overall pt states she feels well.     LMP: postmenopausal  Sexually Active: monogamous  Last pap smear: overdue, completed today  Last mammogram: ordered per Dr. Armstrong  (h/o Breast Ca)  Last colonoscopy: 8/2023 (rpt in 10 yrs)  Last DEXA Scan: n/a    Exercise:  4x/weel walks 1/2 mi, alternating with running 1/2 mi .    Diet:  good    Joint pain:  She wakes with swollen knuckles in the morning, she had to resize her rings.  She also has stiffness at night.  She has not taken any medications for this.  She is requesting autoimmue labs- per chart review, labs were ordered by her oncologist, Dr. Islas.  Autoimmune workup was negative.     Abdominal pain:  She has had a 1 month h/o abdominal pain, cramping pain.  No constipation.  She has loose stools occasionally.  She has started a probiotic.      Breast Ca f-u: she is followed by breast surgeon, Dr. Armstrong.      Previous HPI: she was dx'd with DCIS in 2021 s/p lumpectomy.  No FHx of Breast Ca in the family. Now that she has moved to the [facility], she is looking to establish with Dr. Armstrong.  She is not on Tamoxifen therapy.     Anemia f-u: pt feels well- no dizziness, no fatigue.  She is no longer having a period since she is  postmenopausal.    Previous HPI: She has a h/o SWEETIE.  Had been on Ferrous Sulfate in the past.  Was having heavy menstrual periods.  LMP 3/2022.               Current Outpatient Medications   Medication Sig Dispense Refill    ibuprofen (ADVIL) 200 MG Oral Tab Take 2 tablets (400 mg total) by mouth every 6 (six) hours as needed for Pain.        Past Medical History:   Diagnosis Date    Anemia [id number]    normal last time I was tested    Anxiety Since childhood    Arthritis , in lower back    Feel like maybe developing in hands & feet    Back pain     on and off for years, lower back    Blood in urine 2022    in conjunction with UTIs noted above    Breast cancer (HCC)     Change in hair     nails lift from beds, thinning hair    Frequent urination 2022    seem to be under control since     Frequent UTI 2022    3 or 4 since 2022, seem to be under control, none since May, 2023    Heavy menses     on and off during reproductive years    Hemorrhoids     result of childbirth, occasionally    Menses painful 1984    no periods since     Pain in joints     hands, feet, knee    Painful urination 2022    better since May, 2023, occasionally still difficult    Rash     on and off, under arms, top of feet, forearms, stomach    Skin blushing/flushing     Stress 2013    Wears glasses     Weight gain     from menopause(?)      Past Surgical History:   Procedure Laterality Date    LUMPECTOMY LEFT  2021      Single , twins 1994    RADIATION LEFT        Family History   Problem Relation Age of Onset    DCIS Self 52    Asthma Mother     Hypertension Mother         Overweight    Obesity Mother     Colon Polyps Mother     Lipids Father         Eats fast food daily    Diabetes Father     No Known Problems Sister     No Known Problems Sister     No Known Problems Sister     Obesity Sister     No Known Problems Brother     No Known Problems Brother     No  Known Problems Daughter     No Known Problems Son     No Known Problems Son     Alcohol and Other Disorders Associated Maternal Aunt     Alcohol and Other Disorders Associated Maternal Uncle       Social History:  Social History     Socioeconomic History    Marital status:    Tobacco Use    Smoking status: Never    Smokeless tobacco: Never   Vaping Use    Vaping Use: Never used   Substance and Sexual Activity    Alcohol use: Yes     Alcohol/week: 5.0 standard drinks of alcohol     Types: 4 Glasses of wine, 1 Shots of liquor per week     Comment: social    Drug use: Never   Other Topics Concern    Caffeine Concern Yes     Comment: 1 cup of coffee daily, occ diet soda    Exercise Yes     Comment: walking/jogging 5 times per week    Seat Belt Yes    Special Diet No    Stress Concern No    Weight Concern No       Allergies:  Allergies   Allergen Reactions    Mastisol Adhesive RASH        REVIEW OF SYSTEMS:     Review of Systems   Constitutional:  Negative for appetite change and fatigue.   Gastrointestinal:  Positive for abdominal pain and diarrhea. Negative for blood in stool, constipation, nausea and vomiting.   Genitourinary:  Negative for dysuria.   Musculoskeletal:  Positive for arthralgias and joint swelling. Negative for myalgias.        EXAM:   /74 (BP Location: Left arm, Patient Position: Sitting, Cuff Size: adult)   Pulse 73   Temp 98 °F (36.7 °C) (Temporal)   Resp 16   Ht 5' 7.37\" (1.711 m)   Wt 136 lb 12.8 oz (62.1 kg)   SpO2 100%   BMI 21.19 kg/m²    GENERAL: WD/WN in no acute distress.   HEENT: PERRLA and EOMI.  OP moist no lesions.TM WNL, esau.Normal ears canals bilaterally.  Neck is supple, with no cervical LAD or thyroid abnormalities.  LUNGS: are clear to auscultation bilaterally, with no wheeze, rhonchi, or rales.   HEART: is RRR.  S1, S2, with no murmurs,clicks, gallops  BREAST:deferred  ABDOMEN: is soft,NBS, NT/ND with no HSM.  No rebound or guarding. No CVA tenderness, no  hernias.   EXAM: Speculum placed and vaginal wall visualized without abnormalities. Liquid Based PAP performed.  Cervix is normal, non-friable, with a closed OS and no abnormal D/C. Bimanual exam shows no CMT or adenexal masses or tenderness.  RECTAL EXAM: deferred  NEURO: Cranial nerves II-XII normal,no focal abnormalities, and reflexes coordination and gait normal and symmetric.Sensation intact.  EXTREMETIES: are symmetric with no cyanosis, clubbing, or edema. Joints appear normal- no edema, no erythema. Nontender.   MS: Normal muscles tones, no joints abnormalities.  SKIN: Normal color, turgor, no lesions, rashes or wounds.  PSYCH: normal affect and mood.    ASSESSMENT AND PLAN:     53 year old female with     1. Annual physical exam  Routine labs ordered today, await results. Counseled pt on healthy lifestyle changes. Vaccines today: UTD . Contraception: postmenopausal .     Last pap smear: overdue, completed today  Last mammogram: ordered per Dr. Armstrong  (h/o Breast Ca)  Last colonoscopy: 8/2023 (rpt in 10 yrs)  Last DEXA Scan: n/a    - CBC [6399] [Q]  - COMP METABOLIC PANEL [77614] [Q]  - LIPID PANEL [7600] [Q]  - TSH W REFLEX TO FREE T4 [34832][Q]    2. Generalized abdominal pain  - suspect is due to diet  - recommend low FODMAP diet  - cont probiotic  - if sx worsen or persist, advised to f-u    3. Arthralgia of both hands  - per chart review, her oncologist Chelsey Islas ordered autoimmune workup in 11/2022, was all normal- order XR Hands, await results    - XR HAND BILAT (MIN 3 VIEWS) (CPT=73130-50); Future    4. Ductal carcinoma in situ (DCIS) of left breast  - dx'd in 2021, s/p lumpectomy (left)  - followed by Dr. Armstrong    5. Iron deficiency anemia, unspecified iron deficiency anemia type  - asymptomatic  - check CBC     - CBC [6399] [Q]    6. Cervical cancer screening    - ThinPrep PAP Smear; Future  - Hpv Dna  High Risk , Thin Prep Collect; Future  - ThinPrep PAP Smear  - Hpv Dna  High Risk , Thin  Prep Collect  - Image-Guided Pap Smear ([facility])    7. Screening for endocrine, metabolic, and immunity disorder    - COMP METABOLIC PANEL [81451] [Q]  - TSH W REFLEX TO FREE T4 [49148][Q]    8. Screening for cardiovascular condition    - LIPID PANEL [8670] [Q]        Pt's was recommended low fat diet and aerobic exercise 30 minutes three times weekly.   Health maintenance.   Osteoporosis prevention addressed  Recommended whole food type diet, eliminate processed food/low sugar and low sat fat diet      The patient indicates understanding of these issues and agrees to the plan.    Return in about 1 year (around 1/29/2025) for annual physical.

## 2024-03-05 ENCOUNTER — TELEPHONE (OUTPATIENT)
Dept: UROLOGY | Facility: CLINIC | Age: 54
End: 2024-03-05

## 2024-03-05 ENCOUNTER — LAB ENCOUNTER (OUTPATIENT)
Dept: LAB | Age: 54
End: 2024-03-05
Attending: OBSTETRICS & GYNECOLOGY
Payer: COMMERCIAL

## 2024-03-05 DIAGNOSIS — R30.0 DYSURIA: ICD-10-CM

## 2024-03-05 DIAGNOSIS — R30.0 DYSURIA: Primary | ICD-10-CM

## 2024-03-05 PROCEDURE — 87088 URINE BACTERIA CULTURE: CPT

## 2024-03-05 PROCEDURE — 87086 URINE CULTURE/COLONY COUNT: CPT

## 2024-03-05 PROCEDURE — 87186 SC STD MICRODIL/AGAR DIL: CPT

## 2024-03-05 RX ORDER — NITROFURANTOIN 25; 75 MG/1; MG/1
100 CAPSULE ORAL 2 TIMES DAILY
Qty: 14 CAPSULE | Refills: 0 | Status: SHIPPED | OUTPATIENT
Start: 2024-03-05

## 2024-03-05 NOTE — TELEPHONE ENCOUNTER
Telephone call received from patient.     Patient complains of UTI signs and symptoms including frequency, dysuria x 1 day    Denies fever    Allergies and previous cultures reviewed    Discussed with BATOOL Tafoya Macrobid 100 mg PO bid x 7 days    Urine culture ordered, will test @ [facility] lab    Empiric antibiotics sent to patient's preferred pharmacy     Encouraged PO hydration, AZO prn for pain     All questions answered    Encouraged to call if symptoms worsen or fail to improve     Patient understands and agrees to plan

## 2024-03-07 NOTE — TELEPHONE ENCOUNTER
TC from pt saying she did not receive her macrobid. States she called [facility] automated system and they had nothing for pt.   Phoned Lalita, who clarified pt script has been ready for pickup for 2 days.   Phoned pt and LM on VM her script was ready for  at [facility].

## 2024-04-01 ENCOUNTER — HOSPITAL ENCOUNTER (OUTPATIENT)
Dept: MAMMOGRAPHY | Facility: HOSPITAL | Age: 54
Discharge: HOME | End: 2024-04-01
Payer: COMMERCIAL

## 2024-04-01 DIAGNOSIS — D05.12 BREAST NEOPLASM, TIS (DCIS), LEFT: ICD-10-CM

## 2024-04-01 PROCEDURE — 77062 BREAST TOMOSYNTHESIS BI: CPT

## 2024-04-01 PROCEDURE — 77066 DX MAMMO INCL CAD BI: CPT

## 2024-04-29 ENCOUNTER — TELEPHONE (OUTPATIENT)
Dept: UROLOGY | Facility: CLINIC | Age: 54
End: 2024-04-29

## 2024-04-29 ENCOUNTER — LAB ENCOUNTER (OUTPATIENT)
Dept: LAB | Age: 54
End: 2024-04-29
Attending: OBSTETRICS & GYNECOLOGY
Payer: COMMERCIAL

## 2024-04-29 DIAGNOSIS — R30.0 DYSURIA: ICD-10-CM

## 2024-04-29 DIAGNOSIS — R30.0 DYSURIA: Primary | ICD-10-CM

## 2024-04-29 PROCEDURE — 87086 URINE CULTURE/COLONY COUNT: CPT

## 2024-04-29 PROCEDURE — 87077 CULTURE AEROBIC IDENTIFY: CPT

## 2024-04-29 PROCEDURE — 87186 SC STD MICRODIL/AGAR DIL: CPT

## 2024-04-29 RX ORDER — NITROFURANTOIN 25; 75 MG/1; MG/1
100 CAPSULE ORAL 2 TIMES DAILY
Qty: 14 CAPSULE | Refills: 0 | Status: SHIPPED | OUTPATIENT
Start: 2024-04-29

## 2024-04-29 NOTE — TELEPHONE ENCOUNTER
Telephone call received from patient.     Patient complains of UTI signs and symptoms including  dysuria x 1-2 days    Denies fever    Allergies and previous cultures reviewed    Hx incomplete emptying: N    Using Estrace:  N/A    Recent ucxs:   3/5/24->100K e coli- tx NTF x 7d  8/9/23- no growth  5/26/23-no growth    Discussed with BATOOL Tafoya Macrobid 100 mg PO bid x 7 days    Urine culture ordered, will test @ [facility] lab    Empiric antibiotics sent to patient's preferred pharmacy     Encouraged PO hydration    All questions answered    Encouraged to call if symptoms worsen or fail to improve     F/u appt w/ DANNA Olson in Nov.    Patient understands and agrees to plan

## 2024-08-19 ENCOUNTER — OFFICE VISIT (OUTPATIENT)
Dept: SURGERY | Facility: CLINIC | Age: 54
End: 2024-08-19
Payer: COMMERCIAL

## 2024-08-19 ENCOUNTER — TELEPHONE (OUTPATIENT)
Dept: SURGERY | Facility: CLINIC | Age: 54
End: 2024-08-19

## 2024-08-19 VITALS
DIASTOLIC BLOOD PRESSURE: 63 MMHG | TEMPERATURE: 98 F | WEIGHT: 138.81 LBS | OXYGEN SATURATION: 96 % | HEART RATE: 73 BPM | BODY MASS INDEX: 22 KG/M2 | SYSTOLIC BLOOD PRESSURE: 100 MMHG | RESPIRATION RATE: 17 BRPM

## 2024-08-19 DIAGNOSIS — D05.12 DUCTAL CARCINOMA IN SITU (DCIS) OF LEFT BREAST: Primary | ICD-10-CM

## 2024-08-19 PROCEDURE — 99213 OFFICE O/P EST LOW 20 MIN: CPT | Performed by: SURGERY

## 2024-08-19 NOTE — TELEPHONE ENCOUNTER
Patient called to see if she can get an order for an annual Mammogram for April 2025. Informed patient that will check with Dr. Armstrong and will put in an order for the same as per Dr. Armstrong recommendation. Patient verbalized understanding and will contact pour office if have any further question.

## 2024-08-26 DIAGNOSIS — D05.12 BREAST NEOPLASM, TIS (DCIS), LEFT: ICD-10-CM

## 2024-08-26 DIAGNOSIS — D05.12 DUCTAL CARCINOMA IN SITU (DCIS) OF LEFT BREAST: Primary | ICD-10-CM

## 2024-08-31 NOTE — PROGRESS NOTES
Breast Surgery Surveillance Visit      History of Present Illness:   Ms. Shantal Castro is a 53 year old woman who presents with a personal history of left breast DCIS to establish care following a recent move.  She was treated in she has no known family history of breast cancer.  She reports that her surgery and radiation took place without sequelae. She was treated in 2022 with a left breast lumpectomy and radiation therapy.  She elected not to take any risk reducing endocrine therapy. She denies any acute palpable masses, nipple discharge, skin changes or axillary concerns.  Her most recent bilateral diagnostic evaluation demonstrated no suspicious findings in 2024. She is here today for evaluation and recommendations for further therapy.        Past Medical History:    Anemia    normal last time I was tested    Anxiety    Arthritis    Feel like maybe developing in hands & feet    Back pain    on and off for years, lower back    Blood in urine    in conjunction with UTIs noted above    Breast cancer (HCC)    Change in hair    nails lift from beds, thinning hair    Frequent urination    seem to be under control since     Frequent UTI    3 or 4 since 2022, seem to be under control, none since May, 2023    Heavy menses    on and off during reproductive years    Hemorrhoids    result of childbirth, occasionally    Menses painful    no periods since     Pain in joints    hands, feet, knee    Painful urination    better since May, 2023, occasionally still difficult    Rash    on and off, under arms, top of feet, forearms, stomach    Skin blushing/flushing    Stress    Wears glasses    Weight gain    from menopause(?)       Past Surgical History:   Procedure Laterality Date    Lumpectomy left  2021      Single , twins     Radiation left         Gynecological History:  Pt is a   Pt was 21 years old at time of first pregnancy.    She has cumulative breastfeeding  history of 10 months.  She achieved menarche at age 13 and LMP 51  Age of Menopause: 51  Type: natural menopause  She denies any history of hormone replacement therapy   She has history of oral contraceptive use for 4 years, last in 2020.  She denies infertility treatment to achieve pregnancy.    Medications:    No outpatient medications have been marked as taking for the 8/19/24 encounter (Office Visit) with Connie Armstrong MD.       Allergies:    Allergies   Allergen Reactions    Mastisol Adhesive RASH       Family History:   Family History   Problem Relation Age of Onset    Breast Cancer Self 50    DCIS Self 51    Asthma Mother     Hypertension Mother         Overweight    Obesity Mother     Colon Polyps Mother     Lipids Father         Eats fast food daily    Diabetes Father     No Known Problems Sister     No Known Problems Sister     No Known Problems Sister     Obesity Sister     No Known Problems Brother     No Known Problems Brother     No Known Problems Daughter     No Known Problems Son     No Known Problems Son     Alcohol and Other Disorders Associated Maternal Aunt     Alcohol and Other Disorders Associated Maternal Uncle        She is not of Ashkenazi Mormonism ancestry.    Social History:  History   Alcohol Use    5.0 standard drinks of alcohol/week    4 Glasses of wine, 1 Shots of liquor per week     Comment: social       History   Smoking Status    Never   Smokeless Tobacco    Never     Ms. Shantal Castro is  with 3 children. She has 5 siblings. She is currently Employed full-time    Review of Systems:  General:   The patient denies, fever, chills, night sweats, fatigue, generalized weakness, change in appetite or weight loss.    HEENT:     The patient denies eye irritation, cataracts, redness, glaucoma, yellowing of the eyes, change in vision, color blindness, or +wearing contacts/glasses. The patient denies hearing loss, ringing in the ears, ear drainage, earaches, nasal congestion,  nose bleeds, snoring, pain in mouth/throat, hoarseness, change in voice, facial trauma.    Respiratory:  The patient denies chronic cough, phlegm, hemoptysis, pleurisy/chest pain, pneumonia, asthma, wheezing, difficulty in breathing with exertion, emphysema, chronic bronchitis, shortness of breath or abnormal sound when breathing.     Cardiovascular:  There is no history of chest pain, chest pressure/discomfort, palpitations, irregular heartbeat, fainting or near-fainting, difficulty breathing when lying flat, SOB/Coughing at night, swelling of the legs or chest pain while walking.    Breasts:  See history of present illness    Gastrointestinal:     There is no history of difficulty or pain with swallowing, reflux symptoms, vomiting, dark or bloody stools, constipation, yellowing of the skin, indigestion, nausea, change in bowel habits, diarrhea, abdominal pain or vomiting blood.     Genitourinary:  The patient denies +frequent urination, +needing to get up at night to urinate, urinary hesitancy or retaining urine, painful urination, urinary incontinence, decreased urine stream, +blood in the urine or vaginal/penile discharge.    Skin:    The patient denies rash, itching, skin lesions, +dry skin, change in skin color or change in moles.     Hematologic/Lymphatic:  The patient denies +easily bruising or bleeding or persistent swollen glands or lymph nodes.     Musculoskeletal:  The patient denies muscle aches/pain, +joint pain, +stiff joints, neck pain, back pain or bone pain.    Neuropsychiatric:  There is no history of migraines or severe headaches, seizure/epilepsy, speech problems, coordination problems, trembling/tremors, fainting/black outs, dizziness, memory problems, +loss of sensation/numbness, problems walking, weakness, +tingling or burning in hands/feet. There is no history of abusive relationship, bipolar disorder, sleep disturbance, +anxiety, depression or feeling of despair.    Endocrine:    There is  no history of poor/slow wound healing, weight loss/gain, fertility or hormone problems, cold intolerance, thyroid disease.     Allergic/Immunologic:  There is no history of hives, hay fever, angioedema or anaphylaxis.    /63 (BP Location: Left arm, Patient Position: Sitting, Cuff Size: adult)   Pulse 73   Temp 98.1 °F (36.7 °C) (Temporal)   Resp 17   Wt 63 kg (138 lb 12.8 oz)   SpO2 96%   BMI 21.50 kg/m²     Physical Exam:  The patient is an alert, oriented, well-nourished and  well-developed woman who appears her stated age. Her speech patterns and movements are normal. Her affect is appropriate.    HEENT: The head is normocephalic. The neck is supple. The thyroid is not enlarged and is without palpable masses/nodules. There are no palpable masses. The trachea is in the midline. Conjunctiva are clear, non-icteric.    Chest: The chest expands symmetrically. The lungs are clear to auscultation.    Heart: The rhythm is regular.  There are no murmurs, rubs, gallops or thrills.    Breasts:  Her breasts are symmetrical with a cup size 34B.  Right breast: The skin, nipple ,and areola appear normal. There is no skin dimpling with movement of the pectoralis. There is no nipple retraction. No nipple discharge can be elicited. The parenchyma is mildly nodular. There are no dominant masses in the breast. The axillary tail is normal.  Left breast:   The skin, nipple, and areola appear normal. There is no skin dimpling with movement of the pectoralis. There is no nipple retraction. No nipple discharge can be elicited. The parenchyma is mildly nodular. There are no dominant masses in the breast. The axillary tail is normal.  There is a well-healed incision on the breast with no underlying palpable concerns.    Abdomen:  The abdomen is soft, flat and non tender. The liver is not enlarged. There are no palpable masses.    Lymph Nodes:  The supraclavicular, axillary and cervical regions are free of significant  lymphadenopathy.    Back: There is no vertebral column tenderness.    Skin: The skin appears normal. There are no suspicious appearing rashes or lesions.    Extremities: The extremities are without deformity, cyanosis or edema.    Impression:   Ms. Shantal Castro is a 53 year old woman presents with personal history of left breast DCIS status postlumpectomy and radiation with right breast enhancement seen on MRI which has now resolved.     Discussion and Plan:  I had a discussion with the Patient regarding her breast exam. On exam today I found no clinical obvious evidence of any new or recurrent disease.  I  reviewed her recent imaging which is concordant with her exam today.   She elected not to take any risk reducing endocrine therapy for concern of side effects. We will plan for a MRI in October 2024 and a bilateral mammogram in April 2025 for ongoing surveillance.   She will need to be seen regularly for clinical breast exams. She was given ample opportunity for questions and those questions were answered to her satisfaction. She has been  encouraged to contact the office with any questions or concerns prior to her next appointment.     This encounter lasted a total of 25 minutes, more than 50% of which was dedicated to the discussion of management options.

## 2024-09-12 ENCOUNTER — OFFICE VISIT (OUTPATIENT)
Dept: FAMILY MEDICINE CLINIC | Facility: CLINIC | Age: 54
End: 2024-09-12
Payer: COMMERCIAL

## 2024-09-12 VITALS
WEIGHT: 136 LBS | HEART RATE: 78 BPM | HEIGHT: 68 IN | OXYGEN SATURATION: 97 % | BODY MASS INDEX: 20.61 KG/M2 | RESPIRATION RATE: 16 BRPM | DIASTOLIC BLOOD PRESSURE: 68 MMHG | TEMPERATURE: 99 F | SYSTOLIC BLOOD PRESSURE: 110 MMHG

## 2024-09-12 DIAGNOSIS — Z20.822 EXPOSURE TO COVID-19 VIRUS: ICD-10-CM

## 2024-09-12 DIAGNOSIS — R05.1 ACUTE COUGH: ICD-10-CM

## 2024-09-12 DIAGNOSIS — J02.9 SORE THROAT: Primary | ICD-10-CM

## 2024-09-12 LAB
CONTROL LINE PRESENT WITH A CLEAR BACKGROUND (YES/NO): YES YES/NO
KIT LOT #: NORMAL NUMERIC
STREP GRP A CUL-SCR: NEGATIVE

## 2024-09-12 PROCEDURE — 99213 OFFICE O/P EST LOW 20 MIN: CPT | Performed by: NURSE PRACTITIONER

## 2024-09-12 PROCEDURE — 87880 STREP A ASSAY W/OPTIC: CPT | Performed by: NURSE PRACTITIONER

## 2024-09-12 NOTE — PROGRESS NOTES
Subjective:   Patient ID: Shantal Castro is a 53 year old female.    Patient is a 53 year old female who presents today with complaints of sore throat, bilateral ear pressure/itching, body aches, chills, slight cough (worse at night) and chest congestion x 3 days. Denies runny nose, nasal congestion, SOB, chest pain, wheezing, fevers, n/v/d or abdominal pain. Tolerating PO well at home. Attempted treatment prior to arrival = Tylenol and Motrin.  had COVID last week. Patient has tested the past 3 days at home and all tests negative.        History/Other:   Review of Systems   Constitutional:  Positive for chills. Negative for appetite change and fever.   HENT:  Positive for ear pain and sore throat. Negative for congestion and rhinorrhea.    Respiratory:  Positive for cough. Negative for shortness of breath and wheezing.    Cardiovascular:  Negative for chest pain.   Gastrointestinal:  Negative for abdominal pain, diarrhea, nausea and vomiting.   Musculoskeletal:  Positive for myalgias.     Current Outpatient Medications   Medication Sig Dispense Refill    ibuprofen (ADVIL) 200 MG Oral Tab Take 2 tablets (400 mg total) by mouth every 6 (six) hours as needed for Pain.       Allergies:  Allergies   Allergen Reactions    Mastisol Adhesive RASH     /68   Pulse 78   Temp 98.6 °F (37 °C)   Resp 16   Ht 5' 8\" (1.727 m)   Wt 136 lb (61.7 kg)   SpO2 97%   BMI 20.68 kg/m²     Objective:   Physical Exam  Vitals reviewed.   Constitutional:       General: She is awake. She is not in acute distress.     Appearance: Normal appearance. She is well-developed and well-groomed. She is not ill-appearing, toxic-appearing or diaphoretic.   HENT:      Head: Normocephalic and atraumatic.      Right Ear: Tympanic membrane, ear canal and external ear normal.      Left Ear: Tympanic membrane, ear canal and external ear normal.      Nose: Nose normal.      Mouth/Throat:      Lips: Pink.      Mouth: Mucous membranes are  moist. No oral lesions.      Pharynx: Oropharynx is clear. Uvula midline. Posterior oropharyngeal erythema present.      Comments: + cobblestoning to posterior oropharynx  Cardiovascular:      Rate and Rhythm: Normal rate and regular rhythm.   Pulmonary:      Effort: Pulmonary effort is normal. No respiratory distress.      Breath sounds: Normal breath sounds and air entry. No decreased breath sounds, wheezing, rhonchi or rales.   Lymphadenopathy:      Cervical: No cervical adenopathy.   Skin:     General: Skin is warm and dry.   Neurological:      Mental Status: She is alert and oriented to person, place, and time.   Psychiatric:         Behavior: Behavior is cooperative.         Assessment & Plan:   1. Sore throat    2. Exposure to COVID-19 virus    3. Acute cough        Orders Placed This Encounter   Procedures    Strep A Assay W/Optic     Results for orders placed or performed in visit on 09/12/24   Strep A Assay W/Optic    Collection Time: 09/12/24  5:52 PM   Result Value Ref Range    Strep Grp A Screen negative Negative    Control Line Present with a clear background (yes/no) yes Yes/No    Kit Lot # [id number] Numeric    Kit Expiration Date 5/21/25 Date       Meds This Visit:  Requested Prescriptions      No prescriptions requested or ordered in this encounter     Reviewed POC test results with patient.  Reassuring physical exam findings. Vitals WNL. No sign of bacterial etiology, RDS or dehydration at this time.  Supportive care and return to care measures reviewed.  Patient v/u and is comfortable with this plan.    Patient Instructions   Tylenol and Motrin as needed  Rest, push fluids  Mucinex DM over the counter for nasal congestion/cough  START daily antihistamine (Zyrtec, Claritin, Allegra) and choose one of those. Take daily for 1-2 weeks to help with any post nasal drainage/sore throat  START Flonase nasal spray daily. 1 spray in each nostril  Return to care for new/worsening symptoms or if symptoms  persist for 2+ weeks without any improvement

## 2024-10-02 ENCOUNTER — LAB ENCOUNTER (OUTPATIENT)
Dept: LAB | Age: 54
End: 2024-10-02
Attending: OBSTETRICS & GYNECOLOGY
Payer: COMMERCIAL

## 2024-10-02 ENCOUNTER — TELEPHONE (OUTPATIENT)
Dept: UROLOGY | Facility: CLINIC | Age: 54
End: 2024-10-02

## 2024-10-02 DIAGNOSIS — R30.0 DYSURIA: Primary | ICD-10-CM

## 2024-10-02 DIAGNOSIS — R30.0 DYSURIA: ICD-10-CM

## 2024-10-02 PROCEDURE — 87088 URINE BACTERIA CULTURE: CPT

## 2024-10-02 PROCEDURE — 87186 SC STD MICRODIL/AGAR DIL: CPT

## 2024-10-02 PROCEDURE — 87086 URINE CULTURE/COLONY COUNT: CPT

## 2024-10-02 RX ORDER — NITROFURANTOIN 25; 75 MG/1; MG/1
100 CAPSULE ORAL 2 TIMES DAILY
Qty: 14 CAPSULE | Refills: 0 | Status: SHIPPED | OUTPATIENT
Start: 2024-10-02

## 2024-10-02 NOTE — TELEPHONE ENCOUNTER
Telephone call received from patient.     Patient complains of UTI signs and symptoms including frequency and dysuria   Denies fever    Allergies and previous cultures reviewed    Hx incomplete emptying: N    Not taking for suppression.    Using Estrace: N    Recent ucxs:   4/29/24 >100K E Coli NTF x7d  3/05/24 >100K E Coli NTF x7d  8/09/23 no growth    Discussed with BATOOL Tafoya Macrobid 100 mg PO bid x 7 days    Urine culture ordered, will test @ [place] lab location    Empiric antibiotics sent to patient's preferred pharmacy     Encouraged PO hydration    All questions answered    Encouraged to call if symptoms worsen or fail to improve     Patient understands and agrees to plan

## 2024-10-29 ENCOUNTER — TELEPHONE (OUTPATIENT)
Dept: UROLOGY | Facility: CLINIC | Age: 54
End: 2024-10-29

## 2024-11-04 ENCOUNTER — OFFICE VISIT (OUTPATIENT)
Dept: UROLOGY | Facility: CLINIC | Age: 54
End: 2024-11-04
Attending: OBSTETRICS & GYNECOLOGY
Payer: COMMERCIAL

## 2024-11-04 VITALS — BODY MASS INDEX: 21 KG/M2 | RESPIRATION RATE: 17 BRPM | HEIGHT: 68 IN

## 2024-11-04 DIAGNOSIS — N94.10 DYSPAREUNIA, FEMALE: ICD-10-CM

## 2024-11-04 DIAGNOSIS — N39.0 FREQUENT UTI: Primary | ICD-10-CM

## 2024-11-04 DIAGNOSIS — N95.2 POSTMENOPAUSAL ATROPHIC VAGINITIS: ICD-10-CM

## 2024-11-04 DIAGNOSIS — N81.84 PELVIC MUSCLE WASTING: ICD-10-CM

## 2024-11-04 LAB
CONTROL RUN WITHIN 24 HOURS?: YES
LEUKOCYTE ESTERASE URINE: NEGATIVE
NITRITE URINE: NEGATIVE

## 2024-11-04 PROCEDURE — 81002 URINALYSIS NONAUTO W/O SCOPE: CPT | Performed by: PHYSICIAN ASSISTANT

## 2024-11-04 PROCEDURE — 99212 OFFICE O/P EST SF 10 MIN: CPT

## 2024-11-04 RX ORDER — GARLIC EXTRACT 500 MG
1 CAPSULE ORAL DAILY
COMMUNITY

## 2024-11-04 RX ORDER — NITROFURANTOIN MACROCRYSTALS 100 MG/1
CAPSULE ORAL
Qty: 30 CAPSULE | Refills: 2 | Status: SHIPPED | OUTPATIENT
Start: 2024-11-04

## 2024-11-04 RX ORDER — ESTRADIOL 0.1 MG/G
CREAM VAGINAL
Qty: 42.5 G | Refills: 3 | Status: SHIPPED | OUTPATIENT
Start: 2024-11-04

## 2024-11-04 RX ORDER — PHENAZOPYRIDINE HYDROCHLORIDE 95 MG/1
95 TABLET ORAL 2 TIMES DAILY PRN
COMMUNITY

## 2024-11-04 NOTE — PROGRESS NOTES
Patient presents to follow up frequent UTIs    See hx of UTIs below  Reports intercourse may be trigger to infection  Reports dyspareunia, dryness    Not using vag estrogen  Hx of L breast DCIS s/p lumpectomy & RT in 2022  Follows with surg onc, Patti    Bowels reg  Denies current UTI s/sx but requesting UCx today    Microhematuria w/u wnl (US 8/23, cysto 11/23)  Denies gross hematuria  Denies vag bleeding  LMP 2022    Urine Hx:  10/02/24 >100K E Coli tx NTF x7d   4/29/24 >100K E Coli tx NTF x7d   3/05/24 >100K E Coli tx NTF x7d   8/09/23 No Growth     Urogynecology Summary:  Urogynecology Summary  Prolapse: No  PERLA: No  Urge Incontinence: No  Nocturia Frequency: 1  Frequency: 2 hours  Incomplete emptying: No  Constipation: No  Wears pad day?: 0  Wears Pad Night?: 0  Activities are limited by UI/POP?: No  Currently Sexually Active: Yes  Avoids sexual activity due to: Pain    Vitals:  Resp 17   Ht 68\"   BMI 20.68 kg/m²     GENERAL EXAM:  GENERAL: alert & oriented, NAD  HEENT: NC/AT, sclera without injection  SKIN: no rashes  LUNGS:  without increased respiratory effort  ABDOMEN: soft, non-tender, non-distended, no masses appreciated  EXT: no edema    PELVIC EXAM: PAULINO Powers, present for exam as a chaperone  External Genitalia: no atrophy, no lesions  Urethra: no atrophy, non tender  Bladder: no fullness, non tender  Vagina: +atrophy, no lesions   Cervix: no bleeding, no lesions, non tender  Uterus: soft, mobile, non tender   Adnexa: no masses, non tender  Perineum: non tender  Anus: no hemorrhoids  Rectum: deferred.      PELVIS FLOOR NEUROMUSCULAR FUNCTION:  Strength:  2  Perineal Sensation:  Normal        PELVIC SUPPORT:  [place]:  0  Ant:  0  Post:  0  CST:  negative  UVJ: not hypermobile    Impression/Plan:    ICD-10-CM    1. Frequent UTI  N39.0 estradiol (ESTRACE) 0.1 MG/GM Vaginal Cream     Urine Culture, Routine     POCT urinalysis dipstick[96824]     nitrofurantoin macrocrystal (MACRODANTIN) 100 MG Oral Cap       2. Postmenopausal atrophic vaginitis  N95.2 estradiol (ESTRACE) 0.1 MG/GM Vaginal Cream     nitrofurantoin macrocrystal (MACRODANTIN) 100 MG Oral Cap      3. Pelvic muscle wasting  N81.84       4. Dyspareunia, female  N94.10           Discussion Items:   Discussed mgmt of vulvovaginal atrophy and frequent UTIs with vaginal estrogen cream. Reviewed associated benefits, risks, alternatives, and goals. Recommend low dose 2x/week treatment.    Discussed management of recurrent urinary tract infections. Discussed use of pharmacologic treatment options for suppression therapy. Risks vs benefits reviewed with patient.     Treatment Plan:  Clean catch urine sent for Cx, follow result, tx if +  Start low dose vag estrogen cream 2x weekly  Start macrodantin 100 mg with intercourse  Vag moisturizers, lube with coitus  Bowel regimen  Bladder diet/drill  Pelvic floor exercises  Call with s/sx of UTI    All questions answered  She understands and agrees to plan    Return in about 3 months (around 2/4/2025) for UTI (phone).    Riya Tucker PA-C    Note to patient: The 21st Century Cures Act makes medical notes like these available to patients in the interest of transparency.  However, be advised this is a medical document.  It is intended as peer to peer communication.  It is written in medical language and may contain abbreviations or verbiage that are unfamiliar.  It may appear blunt or direct.  Medical documents are intended to carry relevant information, facts as evident, and the clinical opinion of the practitioner.

## 2024-12-20 ENCOUNTER — LAB ENCOUNTER (OUTPATIENT)
Dept: LAB | Age: 54
End: 2024-12-20
Attending: PHYSICIAN ASSISTANT
Payer: COMMERCIAL

## 2024-12-20 ENCOUNTER — TELEPHONE (OUTPATIENT)
Dept: UROLOGY | Facility: CLINIC | Age: 54
End: 2024-12-20

## 2024-12-20 DIAGNOSIS — R30.0 DYSURIA: Primary | ICD-10-CM

## 2024-12-20 DIAGNOSIS — R30.0 DYSURIA: ICD-10-CM

## 2024-12-20 PROCEDURE — 87086 URINE CULTURE/COLONY COUNT: CPT

## 2024-12-20 RX ORDER — SULFAMETHOXAZOLE AND TRIMETHOPRIM 800; 160 MG/1; MG/1
1 TABLET ORAL 2 TIMES DAILY
Qty: 14 TABLET | Refills: 0 | Status: SHIPPED | OUTPATIENT
Start: 2024-12-20

## 2024-12-20 NOTE — TELEPHONE ENCOUNTER
Provided condition update for Connie Daily PA, TORB Please have patient submit ucx, due to taking macrodantin multiple times, ucx may be false negative, please advise patient not to do this in the future. Recommend bactrim DS BID for 7 days, take all of antibiotic even if ucx negative.  Called pt and notified of PA recommendations and new orders.  She verbalizes understanding.  Pt submitted urine culture this am, pend for final result.

## 2024-12-20 NOTE — TELEPHONE ENCOUNTER
.Telephone call received from patient.     Patient complains of UTI signs and symptoms including malodorous urine and  +dysuria x 1 day. Took her post coital Macrodantin 2 times yesterday and again this am.  Pt has not had recent intercourse which is trigger in past.  Reports recent multiple travel days.      Denies fever    Allergies and previous cultures reviewed    Hx incomplete emptying:  N    Taking Macrodantin post-coital  for suppression.    Using Estrace:  N, states surg onc Dr. Armstrong did not recommend vag estrogen  Started using vaginal moisturizers    Recent ucxs: (Past 12 months)  11/4/24 no growth cx  10/02/24 >100K E Coli tx NTF x7d   4/29/24 >100K E Coli tx NTF x7d  3/05/24 >100K E Coli tx NTF x7d  8/09/23 No Growth    Urine culture ordered, will test @ [facility] [address]  Pt requesting empiric abx.     Patient understands and agrees to plan

## 2024-12-28 ENCOUNTER — HOSPITAL ENCOUNTER (OUTPATIENT)
Dept: MRI IMAGING | Facility: HOSPITAL | Age: 54
Discharge: HOME | End: 2024-12-28
Attending: SURGERY
Payer: COMMERCIAL

## 2024-12-28 DIAGNOSIS — D05.12 DUCTAL CARCINOMA IN SITU (DCIS) OF LEFT BREAST: ICD-10-CM

## 2024-12-28 PROCEDURE — A9575 INJ GADOTERATE MEGLUMI 0.1ML: HCPCS | Performed by: SURGERY

## 2024-12-28 PROCEDURE — 77049 MRI BREAST C-+ W/CAD BI: CPT | Performed by: SURGERY

## 2024-12-28 RX ORDER — GADOTERATE MEGLUMINE 376.9 MG/ML
15 INJECTION INTRAVENOUS
Status: COMPLETED | OUTPATIENT
Start: 2024-12-28 | End: 2024-12-28

## 2024-12-28 RX ADMIN — GADOTERATE MEGLUMINE 12 ML: 376.9 INJECTION INTRAVENOUS at 12:50:00

## 2025-01-24 ENCOUNTER — TELEPHONE (OUTPATIENT)
Dept: UROLOGY | Facility: CLINIC | Age: 55
End: 2025-01-24

## 2025-01-24 DIAGNOSIS — N95.2 POSTMENOPAUSAL ATROPHIC VAGINITIS: ICD-10-CM

## 2025-01-24 DIAGNOSIS — N39.0 FREQUENT UTI: ICD-10-CM

## 2025-01-24 RX ORDER — ESTRADIOL 0.1 MG/G
CREAM VAGINAL
Qty: 42.5 G | Refills: 3 | Status: SHIPPED | OUTPATIENT
Start: 2025-01-24

## 2025-01-24 NOTE — TELEPHONE ENCOUNTER
Pt called to follow up about Estrace use.  She spoke with breast surgeon, Dr. Armstrong, and would like to proceed with Estrace. Orders on file from last visit with DANNA Meza.  Pt states pharmacy never called to notify her RX was available.  Orders placed per last visit note.  Reviewed Estrace use and instructions, answered all questions, she verbalizes understanding.

## 2025-01-31 ENCOUNTER — OFFICE VISIT (OUTPATIENT)
Dept: FAMILY MEDICINE CLINIC | Facility: CLINIC | Age: 55
End: 2025-01-31
Payer: COMMERCIAL

## 2025-01-31 VITALS
RESPIRATION RATE: 18 BRPM | HEIGHT: 67.6 IN | WEIGHT: 136.81 LBS | TEMPERATURE: 97 F | HEART RATE: 60 BPM | SYSTOLIC BLOOD PRESSURE: 122 MMHG | DIASTOLIC BLOOD PRESSURE: 70 MMHG | OXYGEN SATURATION: 96 % | BODY MASS INDEX: 20.98 KG/M2

## 2025-01-31 DIAGNOSIS — Z13.0 SCREENING FOR ENDOCRINE, NUTRITIONAL, METABOLIC AND IMMUNITY DISORDER: ICD-10-CM

## 2025-01-31 DIAGNOSIS — Z13.228 SCREENING FOR ENDOCRINE, NUTRITIONAL, METABOLIC AND IMMUNITY DISORDER: ICD-10-CM

## 2025-01-31 DIAGNOSIS — D50.9 IRON DEFICIENCY ANEMIA, UNSPECIFIED IRON DEFICIENCY ANEMIA TYPE: ICD-10-CM

## 2025-01-31 DIAGNOSIS — Z13.6 SCREENING FOR ISCHEMIC HEART DISEASE: ICD-10-CM

## 2025-01-31 DIAGNOSIS — Z13.29 SCREENING FOR ENDOCRINE, NUTRITIONAL, METABOLIC AND IMMUNITY DISORDER: ICD-10-CM

## 2025-01-31 DIAGNOSIS — Z13.21 SCREENING FOR ENDOCRINE, NUTRITIONAL, METABOLIC AND IMMUNITY DISORDER: ICD-10-CM

## 2025-01-31 DIAGNOSIS — Z00.00 ANNUAL PHYSICAL EXAM: Primary | ICD-10-CM

## 2025-01-31 DIAGNOSIS — D05.12 DUCTAL CARCINOMA IN SITU (DCIS) OF LEFT BREAST: ICD-10-CM

## 2025-01-31 PROCEDURE — 99396 PREV VISIT EST AGE 40-64: CPT | Performed by: FAMILY MEDICINE

## 2025-01-31 NOTE — PROGRESS NOTES
Chief Complaint   Patient presents with    Physical     Annual exam        HPI:   Shantal Castro is a 54 year old female who presents for a complete physical with gyne exam.   Patient feels well, dental visit up to date, no hearing problem.  Vaccinations up to date.    LMP: postmenopausal  Sexual activity:monogamy   Contraception: postmenopausal  Exercise: walking, 4-5x/wk .  Diet:  regular, balanced    Wt Readings from Last 3 Encounters:   01/31/25 136 lb 12.8 oz (62.1 kg)   09/12/24 136 lb (61.7 kg)   08/19/24 138 lb 12.8 oz (63 kg)      BP Readings from Last 3 Encounters:   01/31/25 122/70   09/12/24 110/68   08/19/24 100/63     No LMP recorded. (Menstrual status: Menopause).        Annual physical:  Overall pt states she feels well.      LMP: postmenopausal  Sexually Active: monogamous  Last pap smear: 1/2024, normal pap and HPV neg (rpt in 5 yrs)  Last mammogram: ordered per Dr. Armstrong  (h/o Breast Ca)  Last colonoscopy: 8/2023 (rpt in 10 yrs)  Last DEXA Scan: n/a     Exercise:  walking 4-5x.week  Diet:  good, healthy     Breast Ca f-u: she is followed by breast surgeon, Dr. Armstrong.  Recently had Breast MRI, normal.     Previous HPI: she was dx'd with DCIS in 2021 s/p lumpectomy.  No FHx of Breast Ca in the family. Now that she has moved to the [facility], she is looking to establish with Dr. Armstrong.  She is not on Tamoxifen therapy.     Anemia f-u: pt feels well- no dizziness, no fatigue.  She is no longer having a period since she is postmenopausal.     Previous HPI: She has a h/o SWEETIE.  Had been on Ferrous Sulfate in the past.  Was having heavy menstrual periods.  LMP 3/2022.                   Current Outpatient Medications   Medication Sig Dispense Refill    estradiol (ESTRACE) 0.1 MG/GM Vaginal Cream Apply 1/2 gram vaginally 2 times per week. 42.5 g 3    acidophilus-pectin Oral Cap Take 1 capsule by mouth daily.      nitrofurantoin macrocrystal (MACRODANTIN) 100 MG Oral Cap Take 1 capsule by mouth with  Consider other treatment options if artificial tears do not provide relief of symptoms. intercourse 30 capsule 2    ibuprofen (ADVIL) 200 MG Oral Tab Take 2 tablets (400 mg total) by mouth every 6 (six) hours as needed for Pain.        Past Medical History:    Anemia    normal last time I was tested    Anxiety    Arthritis    Feel like maybe developing in hands & feet    Back pain    on and off for years, lower back    Blood in urine    in conjunction with UTIs noted above    Breast cancer (HCC)    Change in hair    nails lift from beds, thinning hair    Frequent urination    seem to be under control since     Frequent UTI    3 or 4 since 2022, seem to be under control, none since May, 2023    Heavy menses    on and off during reproductive years    Hemorrhoids    result of childbirth, occasionally    Menses painful    no periods since     Pain in joints    hands, feet, knee    Painful urination    better since May, 2023, occasionally still difficult    Rash    on and off, under arms, top of feet, forearms, stomach    Skin blushing/flushing    Stress    Wears glasses    Weight gain    from menopause(?)      Past Surgical History:   Procedure Laterality Date    Lumpectomy left  2021      Single , twins     Radiation left        Family History   Problem Relation Age of Onset    Breast Cancer Self 50    DCIS Self 51    Asthma Mother     Hypertension Mother         Overweight    Obesity Mother     Colon Polyps Mother     Lipids Father         Eats fast food daily    Diabetes Father     No Known Problems Sister     No Known Problems Sister     No Known Problems Sister     Obesity Sister     No Known Problems Brother     No Known Problems Brother     No Known Problems Daughter     No Known Problems Son     No Known Problems Son     Alcohol and Other Disorders Associated Maternal Aunt     Alcohol and Other Disorders Associated Maternal Uncle       Social History:  Social History     Socioeconomic History    Marital status:    Tobacco Use    Smoking status: Never     Smokeless tobacco: Never   Vaping Use    Vaping status: Never Used   Substance and Sexual Activity    Alcohol use: Yes     Alcohol/week: 5.0 standard drinks of alcohol     Types: 4 Glasses of wine, 1 Shots of liquor per week     Comment: social    Drug use: Never   Other Topics Concern    Caffeine Concern Yes     Comment: 1 cup of coffee daily, occ diet soda    Exercise Yes     Comment: walking/jogging 5 times per week    Seat Belt Yes    Special Diet No    Stress Concern No    Weight Concern No       Allergies:  Allergies[1]     REVIEW OF SYSTEMS:     Review of Systems   Constitutional:  Negative for appetite change and fatigue.   Respiratory:  Negative for shortness of breath.    Gastrointestinal:  Negative for abdominal pain, constipation, nausea and vomiting.   Genitourinary:  Negative for dysuria.   Neurological:  Negative for dizziness and light-headedness.        EXAM:   /70 (BP Location: Left arm, Patient Position: Sitting, Cuff Size: adult)   Pulse 60   Temp 97.4 °F (36.3 °C) (Temporal)   Resp 18   Ht 5' 7.6\" (1.717 m)   Wt 136 lb 12.8 oz (62.1 kg)   SpO2 96%   BMI 21.05 kg/m²    GENERAL: WD/WN in no acute distress.   HEENT: PERRLA and EOMI.  OP moist no lesions.TM WNL, esau.Normal ears canals bilaterally.  Neck is supple, with no cervical LAD or thyroid abnormalities.  LUNGS: are clear to auscultation bilaterally, with no wheeze, rhonchi, or rales.   HEART: is RRR.  S1, S2, with no murmurs,clicks, gallops  BREAST:deferred  ABDOMEN: is soft,NBS, NT/ND with no HSM.  No rebound or guarding. No CVA tenderness, no hernias.   EXAM: deferred  RECTAL EXAM: deferred  NEURO: Cranial nerves II-XII normal,no focal abnormalities, and reflexes coordination and gait normal and symmetric.Sensation intact.  EXTREMETIES: are symmetric with no cyanosis, clubbing, or edema.  MS: Normal muscles tones, no joints abnormalities.  SKIN: Normal color, turgor, no lesions, rashes or wounds.  PSYCH: normal affect and  mood.    ASSESSMENT AND PLAN:     54 year old female with     1. Annual physical exam  Routine labs ordered today, await results. Counseled pt on healthy lifestyle changes. Vaccines today: UTD . Contraception: postmenopausal .     Last pap smear: 1/2024, normal pap and HPV neg (rpt in 5 yrs)  Last mammogram: ordered per Dr. Armstrong  (h/o Breast Ca)  Last colonoscopy: 8/2023 (rpt in 10 yrs)  Last DEXA Scan: n/a     Exercise:  walking 4-5x.week    - CBC With Differential With Platelet  - Comp Metabolic Panel  - Lipid Panel  - TSH W Reflex To Free T4    2. Ductal carcinoma in situ (DCIS) of left breast  - dx'd in 2021, s/p lumpectomy (left)  - followed by Dr. Lopez    3. Iron deficiency anemia, unspecified iron deficiency anemia type   - asymptomatic, no postmenopausal  - check CBC and iron studies    - CBC With Differential With Platelet  - FERRITIN [457] [Q]  - IRON AND TIBC [7573] [Q]    4. Screening for endocrine, nutritional, metabolic and immunity disorder    - Comp Metabolic Panel  - TSH W Reflex To Free T4    5. Screening for ischemic heart disease    - Lipid Panel        Pt's was recommended low fat diet and aerobic exercise 30 minutes three times weekly.   Health maintenance.   Osteoporosis prevention addressed  Recommended whole food type diet, eliminate processed food/low sugar and low sat fat diet      The patient indicates understanding of these issues and agrees to the plan.    Return in about 1 year (around 1/31/2026) for annual physical.         [1]   Allergies  Allergen Reactions    Mastisol Adhesive RASH

## 2025-02-03 ENCOUNTER — TELEPHONE (OUTPATIENT)
Dept: UROLOGY | Facility: CLINIC | Age: 55
End: 2025-02-03

## 2025-02-03 ENCOUNTER — VIRTUAL PHONE E/M (OUTPATIENT)
Dept: UROLOGY | Facility: CLINIC | Age: 55
End: 2025-02-03
Attending: OBSTETRICS & GYNECOLOGY
Payer: COMMERCIAL

## 2025-02-03 DIAGNOSIS — N95.2 POSTMENOPAUSAL ATROPHIC VAGINITIS: ICD-10-CM

## 2025-02-03 DIAGNOSIS — N81.84 PELVIC MUSCLE WASTING: ICD-10-CM

## 2025-02-03 DIAGNOSIS — N39.0 FREQUENT UTI: Primary | ICD-10-CM

## 2025-02-03 NOTE — PROGRESS NOTES
This visit is being conducted as a phone (audio only) visit with patient's consent.  Patient declined video visit due to technical capacity.  Patient is in a safe, private environment for televisit, provider located in the office setting.    Visit for f/u of frequent UTIs    She is currently taking Macrodantin 100 mg with coitus (began 11/4/24)  Started vag estrogen 2x weekly -- just began last week    Bowels reg     Denies any current signs or symptoms of UTI    Urine Hx:  12/20/24 No Growth (took bactrim DS BID for 7 days, even though culture was negative d/t taking macrodantin 3 times before ucx)  11/04/24 No Growth  10/02/24 >100K E Coli tx NTF x7d   04/29/24 >100K E Coli tx NTF x7d  03/05/24 >100K E Coli tx NTF x7d  08/09/23 No Growth    Impression/Plan:    ICD-10-CM    1. Frequent UTI  N39.0       2. Postmenopausal atrophic vaginitis  N95.2       3. Pelvic muscle wasting  N81.84           Discussion Items:   Discussed mgmt of vulvovaginal atrophy and rUTIs with vaginal estrogen cream. Reviewed associated benefits, risks, alternatives, and goals. Recommend low dose 2x/week treatment.    Discussed management of recurrent urinary tract infections. Discussed use of pharmacologic treatment options for suppression therapy. Risks vs benefits reviewed with patient.     Treatment Plan:  Continue Macrodantin 100 mg with coitus  Continue vag estrogen as prescribed  Bowel regimen  Bladder diet/drill  Pelvic floor exercises  Call with s/sx of UTI    All questions answered  She understands and agrees to plan    Return in about 3 months (around 5/3/2025) for UTI (phone).    Riya Tucker PA-C    A minimum of 10 minutes was spent on this encounter including chart review, discussion with patient/family, and documentation.    Note to patient: The 21st Century Cures Act makes medical notes like these available to patients in the interest of transparency.  However, be advised this is a medical document.  It is intended as peer  to peer communication.  It is written in medical language and may contain abbreviations or verbiage that are unfamiliar.  It may appear blunt or direct.  Medical documents are intended to carry relevant information, facts as evident, and the clinical opinion of the practitioner.

## 2025-02-08 LAB
% SATURATION: 40 % (CALC) (ref 16–45)
ABSOLUTE BASOPHILS: 42 CELLS/UL (ref 0–200)
ABSOLUTE EOSINOPHILS: 129 CELLS/UL (ref 15–500)
ABSOLUTE LYMPHOCYTES: 1421 CELLS/UL (ref 850–3900)
ABSOLUTE MONOCYTES: 422 CELLS/UL (ref 200–950)
ABSOLUTE NEUTROPHILS: 1786 CELLS/UL (ref 1500–7800)
ALBUMIN/GLOBULIN RATIO: 2.2 (CALC) (ref 1–2.5)
ALBUMIN: 4.8 G/DL (ref 3.6–5.1)
ALKALINE PHOSPHATASE: 96 U/L (ref 37–153)
ALT: 13 U/L (ref 6–29)
AST: 20 U/L (ref 10–35)
BASOPHILS: 1.1 %
BILIRUBIN, TOTAL: 0.5 MG/DL (ref 0.2–1.2)
BUN: 22 MG/DL (ref 7–25)
CALCIUM: 10.2 MG/DL (ref 8.6–10.4)
CARBON DIOXIDE: 27 MMOL/L (ref 20–32)
CHLORIDE: 102 MMOL/L (ref 98–110)
CHOL/HDLC RATIO: 2.7 (CALC)
CHOLESTEROL, TOTAL: 236 MG/DL
CREATININE: 0.83 MG/DL (ref 0.5–1.03)
EGFR: 83 ML/MIN/1.73M2
EOSINOPHILS: 3.4 %
FERRITIN: 179 NG/ML (ref 16–232)
GLOBULIN: 2.2 G/DL (CALC) (ref 1.9–3.7)
GLUCOSE: 87 MG/DL (ref 65–99)
HDL CHOLESTEROL: 89 MG/DL
HEMATOCRIT: 40.9 % (ref 35–45)
HEMOGLOBIN: 13.1 G/DL (ref 11.7–15.5)
IRON BINDING CAPACITY: 344 MCG/DL (CALC) (ref 250–450)
IRON, TOTAL: 139 MCG/DL (ref 45–160)
LDL-CHOLESTEROL: 130 MG/DL (CALC)
LYMPHOCYTES: 37.4 %
MCH: 30.3 PG (ref 27–33)
MCHC: 32 G/DL (ref 32–36)
MCV: 94.7 FL (ref 80–100)
MONOCYTES: 11.1 %
NEUTROPHILS: 47 %
NON-HDL CHOLESTEROL: 147 MG/DL (CALC)
POTASSIUM: 4.8 MMOL/L (ref 3.5–5.3)
PROTEIN, TOTAL: 7 G/DL (ref 6.1–8.1)
RDW: 11.9 % (ref 11–15)
RED BLOOD CELL COUNT: 4.32 MILLION/UL (ref 3.8–5.1)
SODIUM: 138 MMOL/L (ref 135–146)
TRIGLYCERIDES: 77 MG/DL
TSH W/REFLEX TO FT4: 3.64 MIU/L
WHITE BLOOD CELL COUNT: 3.8 THOUSAND/UL (ref 3.8–10.8)

## 2025-04-01 ENCOUNTER — HOSPITAL ENCOUNTER (OUTPATIENT)
Dept: MAMMOGRAPHY | Facility: HOSPITAL | Age: 55
Discharge: HOME | End: 2025-04-01
Attending: SURGERY
Payer: COMMERCIAL

## 2025-04-01 DIAGNOSIS — D05.12 BREAST NEOPLASM, TIS (DCIS), LEFT: ICD-10-CM

## 2025-04-01 PROCEDURE — 77062 BREAST TOMOSYNTHESIS BI: CPT | Performed by: SURGERY

## 2025-04-01 PROCEDURE — 77066 DX MAMMO INCL CAD BI: CPT | Performed by: SURGERY

## 2025-04-10 ENCOUNTER — LAB ENCOUNTER (OUTPATIENT)
Dept: LAB | Age: 55
End: 2025-04-10
Attending: PHYSICIAN ASSISTANT
Payer: COMMERCIAL

## 2025-04-10 ENCOUNTER — TELEPHONE (OUTPATIENT)
Dept: UROLOGY | Facility: CLINIC | Age: 55
End: 2025-04-10

## 2025-04-10 DIAGNOSIS — R30.0 DYSURIA: Primary | ICD-10-CM

## 2025-04-10 DIAGNOSIS — R30.0 DYSURIA: ICD-10-CM

## 2025-04-10 PROCEDURE — 87086 URINE CULTURE/COLONY COUNT: CPT

## 2025-04-10 NOTE — TELEPHONE ENCOUNTER
Telephone call received from patient.     Patient complains of UTI signs and symptoms       Allergies and previous cultures reviewed    Hx incomplete emptying: Y or N    Taking NTF for suppression, post coital    Using Estrace: Y or N or N/A    Recent ucxs: (Past 12 months)  12/20/24 No Growth (took bactrim DS BID for 7 days, even though culture was negative d/t taking macrodantin 3 times before ucx)  11/04/24 No Growth  10/02/24 >100K E Coli tx NTF x7d   04/29/24 >100K E Coli tx NTF x7d  03/05/24 >100K E Coli tx NTF x7d  08/09/23 No Growth    LM on VM    Urine culture ordered, will test @  lab    Offered Empiric antibiotics, call back if she wants    Encouraged PO hydration    Encouraged to call if symptoms worsen or fail to improve

## 2025-05-21 ENCOUNTER — VIRTUAL PHONE E/M (OUTPATIENT)
Dept: UROLOGY | Facility: CLINIC | Age: 55
End: 2025-05-21
Attending: OBSTETRICS & GYNECOLOGY
Payer: COMMERCIAL

## 2025-05-21 DIAGNOSIS — N95.2 POSTMENOPAUSAL ATROPHIC VAGINITIS: ICD-10-CM

## 2025-05-21 DIAGNOSIS — N39.0 FREQUENT UTI: Primary | ICD-10-CM

## 2025-05-21 DIAGNOSIS — N81.84 PELVIC MUSCLE WASTING: ICD-10-CM

## 2025-05-21 DIAGNOSIS — R30.0 DYSURIA: ICD-10-CM

## 2025-05-21 RX ORDER — NITROFURANTOIN MACROCRYSTALS 100 MG/1
100 CAPSULE ORAL NIGHTLY
Qty: 90 CAPSULE | Refills: 1 | Status: SHIPPED | OUTPATIENT
Start: 2025-05-21

## 2025-05-21 NOTE — PROGRESS NOTES
This visit is being conducted as a phone (audio only) visit with patient's consent.  Patient declined video visit due to technical capacity.  Patient is in a safe, private environment for televisit, provider located in the office setting.    Visit for f/u of frequent UTIs    She is currently taking Macrodantin 100 mg with coitus (began 11/4/24)    Stopped vag estrogen after 4-6 weeks d/t complaints of heartburn which she reports improved upon stopping med    Using vag moisturizers  Taking Azo cranberry supplement and  probiotic    Bowels reg     Denies any current signs or symptoms of UTI  Reports UTI sx in Dec '24 and April '25 -- no growth & mixed sp on UCx but pt reports taking empiric abx x several doses prior to providing urine specimen    Reports coitus not always trigger to recent UTIs    Concerned about microscopic hematuria  Had neg w/u including US kidneys (8/23) & office cysto with cytology (11/23)  Denies gross hematuria    Urine Hx:  04/10/25 <10K Multi (per pt on 4/14/25, began empiric abx (NTF) prior to submitting Cx, reports sx improved with abx  12/20/24 No Growth (took bactrim DS BID for 7 days, even though culture was negative d/t taking macrodantin 3 times before ucx)  11/04/24 No Growth  10/02/24 >100K E Coli tx NTF x7d   04/29/24 >100K E Coli tx NTF x7d  03/05/24 >100K E Coli tx NTF x7d    Impression/Plan:    ICD-10-CM    1. Frequent UTI  N39.0 nitrofurantoin macrocrystal (MACRODANTIN) 100 MG Oral Cap      2. Postmenopausal atrophic vaginitis  N95.2       3. Pelvic muscle wasting  N81.84       4. Dysuria  R30.0 Urine Culture, Routine          Discussion Items:   Discussed mgmt of vulvovaginal atrophy and rUTIs with vaginal estrogen cream. Reviewed associated benefits, risks, alternatives, and goals. Recommend low dose 2x/week treatment. -- discussed retrying vag estrogen at 1/4-1/2g once weekly and titrating up to 1/2 g twice weekly if tolerating.  Discussed hypoallergenic version of vag  estrogen from compounding pharmacy.  Discussed vagifem.  -- pt will consider options    Discussed management of recurrent urinary tract infections. Discussed use of pharmacologic treatment options for suppression therapy. Risks vs benefits reviewed with patient.     Discussed importance of obtaining UCx with onset of UTI sx and prior to initiating empiric abx.  Pt to stop by office to  urine specimen cups and will place standing order for UCx at [place] [facility].  Pt instructed to call office with UTI sx for empiric abx.    Discussed pt's negative microhematuria w/u in 2023.    Treatment Plan:  Increase Macrodantin to 100 mg nightly for suppression (instead of just with coitus)  Resume vag estrogen as prescribed  Bowel regimen  Bladder diet/drill  Pelvic floor exercises  Call with s/sx of UTI -- pt to  urine specimen cups, standing order for UCx placed for [place] lab    All questions answered  She understands and agrees to plan    Return in about 3 months (around 8/21/2025) for UTI (phone).    Riya Tucker PA-C    A total of 19 minutes were spent in discussion with the patient and/or family on this encounter in addition to time spent on chart review and documentation.    Note to patient: The 21st Century Cures Act makes medical notes like these available to patients in the interest of transparency.  However, be advised this is a medical document.  It is intended as peer to peer communication.  It is written in medical language and may contain abbreviations or verbiage that are unfamiliar.  It may appear blunt or direct.  Medical documents are intended to carry relevant information, facts as evident, and the clinical opinion of the practitioner.

## 2025-05-22 ENCOUNTER — OFFICE VISIT (OUTPATIENT)
Dept: FAMILY MEDICINE CLINIC | Facility: CLINIC | Age: 55
End: 2025-05-22
Payer: COMMERCIAL

## 2025-05-22 VITALS
BODY MASS INDEX: 21.49 KG/M2 | WEIGHT: 140.19 LBS | RESPIRATION RATE: 18 BRPM | OXYGEN SATURATION: 99 % | DIASTOLIC BLOOD PRESSURE: 68 MMHG | TEMPERATURE: 98 F | HEART RATE: 77 BPM | HEIGHT: 67.6 IN | SYSTOLIC BLOOD PRESSURE: 116 MMHG

## 2025-05-22 DIAGNOSIS — K29.70 GASTRITIS WITHOUT BLEEDING, UNSPECIFIED CHRONICITY, UNSPECIFIED GASTRITIS TYPE: ICD-10-CM

## 2025-05-22 DIAGNOSIS — R07.9 CHEST PAIN, UNSPECIFIED TYPE: Primary | ICD-10-CM

## 2025-05-22 DIAGNOSIS — I45.10 RIGHT BUNDLE BRANCH BLOCK: ICD-10-CM

## 2025-05-22 RX ORDER — OMEPRAZOLE 40 MG/1
40 CAPSULE, DELAYED RELEASE ORAL DAILY
Qty: 30 CAPSULE | Refills: 1 | Status: SHIPPED | OUTPATIENT
Start: 2025-05-22

## 2025-05-22 NOTE — PROGRESS NOTES
The following individual(s) verbally consented to be recorded using ambient AI listening technology and understand that they can each withdraw their consent to this listening technology at any point by asking the clinician to turn off or pause the recording:    Patient name: Shantal Castro  Additional names:  karmen

## 2025-05-26 LAB
ATRIAL RATE: 68 BPM
P AXIS: 73 DEGREES
P-R INTERVAL: 144 MS
Q-T INTERVAL: 402 MS
QRS DURATION: 108 MS
QTC CALCULATION (BEZET): 427 MS
R AXIS: 74 DEGREES
T AXIS: 65 DEGREES
VENTRICULAR RATE: 68 BPM

## 2025-05-27 NOTE — PROGRESS NOTES
CHIEF COMPLAINT:   Chief Complaint   Patient presents with    Chest Pain     At night          HPI:     Shantal Castro is a 54 year old female presents for CP at night.    HPI  History of Present Illness    CP (at night):She experiences nocturnal chest pain that began after starting vaginal estrogen cream in February.. Initially severe, the pain woke her from sleep, described as 'trapped gas' with radiation through her chest. It improved with standing or sitting up and apple cider vinegar provided some relief. After stopping the estrogen cream in early March, the pain subsided but recurred over the past two to three weeks, less severe. The pain occurs at night, described as pressure that worsens when lying on her side, sometimes radiating to her back and neck. It does not respond to antacids or over-the-counter remedies. No changes in diet, increased consumption of spicy or acidic foods, or alcohol intake. No daytime symptoms, dizziness, or shortness of breath. She is taking nitrofurantoin for a UTI, started before a recent vacation to [place].    She has a history of breast cancer treated with radiation therapy directed over her heart. No history of clots.    With regards to the CP/abdominal pain. she has reduced alcohol consumption to weekends only and cut out diet soda. Occasional belching provides slight relief. No fever, chills, dark stools, constipation, diarrhea, dizziness, or shortness of breath.            HISTORY:  Past Medical History[1]   Past Surgical History[2]   Family History[3]   Social History: Short Social Hx on File[4]     Medications (Active prior to today's visit):  Current Medications[5]    Allergies:  Allergies[6]    PSFH elements reviewed from today and agreed except as otherwise stated in HPI.  ROS:     Review of Systems      Pertinent positives and negatives noted in the the HPI.    PHYSICAL EXAM:   /68 (BP Location: Left arm, Patient Position: Sitting, Cuff Size: adult)   Pulse  77   Temp 97.5 °F (36.4 °C) (Temporal)   Resp 18   Ht 5' 7.6\" (1.717 m)   Wt 140 lb 3.2 oz (63.6 kg)   SpO2 99%   BMI 21.57 kg/m²   Vital signs reviewed.Appears stated age, well groomed.  Physical Exam  Constitutional:       Appearance: Normal appearance.   HENT:      Head: Normocephalic.   Cardiovascular:      Rate and Rhythm: Normal rate and regular rhythm.      Pulses: Normal pulses.      Heart sounds: Normal heart sounds.   Pulmonary:      Effort: Pulmonary effort is normal.      Breath sounds: Normal breath sounds.   Abdominal:      General: Bowel sounds are normal. There is no distension.      Palpations: Abdomen is soft.      Tenderness: There is no abdominal tenderness. There is no guarding.   Musculoskeletal:      Cervical back: Neck supple.      Right lower leg: No edema.      Left lower leg: No edema.   Lymphadenopathy:      Cervical: No cervical adenopathy.   Skin:     General: Skin is warm and dry.   Neurological:      Mental Status: She is alert and oriented to person, place, and time.   Psychiatric:         Behavior: Behavior normal.          LABS     Office Visit on 05/22/2025   Component Date Value    Ventricular rate 05/22/2025 68     Atrial rate 05/22/2025 68     P-R Interval 05/22/2025 144     QRS Duration 05/22/2025 108     Q-T Interval 05/22/2025 402     QTC Calculation (Bezet) 05/22/2025 427     P Axis 05/22/2025 73     R Axis 05/22/2025 74     T Axis 05/22/2025 65    [facility] Lab Encounter on 04/10/2025   Component Date Value    Urine Culture 04/10/2025 <10,000 cfu/ml Multiple species present- probable contamination.       REVIEWED THIS VISIT  ASSESSMENT/PLAN:   54 year old female with    1. Chest pain, unspecified type  - unclear if cardiac vs GI related  - low suspicion for PE, given VS stable and EKG essentially normal  - NSR, no acute changes; however has new onset RBBB, asymptomatic  - no previous EKG to compare  - referral to Cardiology for further eval    - EKG with interpretation and  Report -IN OFFICE [[id number]]    2. Right bundle branch block  See above.    - Cardio Referral - Internal    3. Gastritis without bleeding, unspecified chronicity, unspecified gastritis type  - start trial of Omeprazole 40 mg QAM x 1-2 months  - if not relieved, then will issue referral to GI    - Omeprazole 40 MG Oral Capsule Delayed Release; Take 1 capsule (40 mg total) by mouth daily.  Dispense: 30 capsule; Refill: 1     The patient and provider have a longitudinal relationship to address/treat the serious or   complex condition(s) as stated in this encounter.       Meds This Visit:  Requested Prescriptions     Signed Prescriptions Disp Refills    Omeprazole 40 MG Oral Capsule Delayed Release 30 capsule 1     Sig: Take 1 capsule (40 mg total) by mouth daily.       Health Maintenance:  Health Maintenance   Topic Date Due    Pneumococcal Vaccine: 50+ Years (1 of 1 - PCV) Never done    COVID-19 Vaccine (4 - 2024-25 season) 09/01/2024    Annual Physical  01/31/2026    Mammogram  04/01/2026    DTaP,Tdap,and Td Vaccines (4 - Td or Tdap) 09/15/2027    Pap Smear  01/29/2029    Colorectal Cancer Screening  08/29/2033    Influenza Vaccine  Completed    Annual Depression Screening  Completed    Zoster Vaccines  Completed    Meningococcal B Vaccine  Aged Out         Patient/Caregiver Education: There are no barriers to learning. Medical education done.   Outcome: Patient verbalizes understanding and agrees with plan. Advised to call or RTC if symptoms persist or worsen.    Problem List:   Problem List[7]    Imaging & Referrals:  ELECTROCARDIOGRAM, COMPLETE  CARDIO - INTERNAL     5/26/2025  Diane Tse MD      Patient understands plan and follow-up.  Return for send me f-u message about stomach pain in 1 month.              [1]   Past Medical History:   Anemia    normal last time I was tested    Anxiety    Arthritis    Feel like maybe developing in hands & feet    Back pain    on and off for years, lower back    Blood  in urine    in conjunction with UTIs noted above    Breast cancer (HCC)    Change in hair    nails lift from beds, thinning hair    Frequent urination    seem to be under control since     Frequent UTI    3 or 4 since 2022, seem to be under control, none since May, 2023    Heavy menses    on and off during reproductive years    Hemorrhoids    result of childbirth, occasionally    Menses painful    no periods since     Pain in joints    hands, feet, knee    Painful urination    better since May, 2023, occasionally still difficult    Rash    on and off, under arms, top of feet, forearms, stomach    Skin blushing/flushing    Stress    Wears glasses    Weight gain    from menopause(?)   [2]   Past Surgical History:  Procedure Laterality Date    Lumpectomy left  2021    DCIS    Laurie biopsy stereo nodule 1 site left (cpt=19081) Left 2021    DCIS      Single , twins     Radiation left     [3]   Family History  Problem Relation Age of Onset    Breast Cancer Self 50    DCIS Self 51    Asthma Mother     Hypertension Mother         Overweight    Obesity Mother     Colon Polyps Mother     Lipids Father         Eats fast food daily    Diabetes Father     No Known Problems Sister     No Known Problems Sister     No Known Problems Sister     Obesity Sister     No Known Problems Brother     No Known Problems Brother     No Known Problems Daughter     No Known Problems Son     No Known Problems Son     Alcohol and Other Disorders Associated Maternal Aunt     Alcohol and Other Disorders Associated Maternal Uncle    [4]   Social History  Socioeconomic History    Marital status:    Tobacco Use    Smoking status: Never    Smokeless tobacco: Never   Vaping Use    Vaping status: Never Used   Substance and Sexual Activity    Alcohol use: Yes     Alcohol/week: 5.0 standard drinks of alcohol     Types: 4 Glasses of wine, 1 Shots of liquor per week     Comment: social    Drug use: Never   Other  Topics Concern    Caffeine Concern Yes     Comment: 1 cup of coffee daily, occ diet soda    Exercise Yes     Comment: walking/jogging 5 times per week    Seat Belt Yes    Special Diet No    Stress Concern No    Weight Concern No   [5]   Current Outpatient Medications   Medication Sig Dispense Refill    Omeprazole 40 MG Oral Capsule Delayed Release Take 1 capsule (40 mg total) by mouth daily. 30 capsule 1    nitrofurantoin macrocrystal (MACRODANTIN) 100 MG Oral Cap Take 1 capsule (100 mg total) by mouth nightly. 90 capsule 1    acidophilus-pectin Oral Cap Take 1 capsule by mouth daily.      ibuprofen (ADVIL) 200 MG Oral Tab Take 2 tablets (400 mg total) by mouth every 6 (six) hours as needed for Pain.      estradiol (ESTRACE) 0.1 MG/GM Vaginal Cream Apply 1/2 gram vaginally 2 times per week. 42.5 g 3   [6]   Allergies  Allergen Reactions    Mastisol Adhesive RASH   [7]   Patient Active Problem List  Diagnosis    Ductal carcinoma in situ (DCIS) of left breast    Idiopathic chronic venous hypertension of both lower extremities without complications    Lumbar disc disorder    Iron deficiency anemia    Anxiety    History of recurrent UTIs    Breast neoplasm, Tis (DCIS), left    Paresthesias    Microscopic hematuria    Varicose veins of both lower extremities    Pain of right lower extremity

## 2025-07-17 DIAGNOSIS — K29.70 GASTRITIS WITHOUT BLEEDING, UNSPECIFIED CHRONICITY, UNSPECIFIED GASTRITIS TYPE: ICD-10-CM

## 2025-07-21 RX ORDER — OMEPRAZOLE 40 MG/1
40 CAPSULE, DELAYED RELEASE ORAL DAILY
Qty: 90 CAPSULE | Refills: 3 | Status: SHIPPED | OUTPATIENT
Start: 2025-07-21

## 2025-08-05 ENCOUNTER — PATIENT MESSAGE (OUTPATIENT)
Dept: FAMILY MEDICINE CLINIC | Facility: CLINIC | Age: 55
End: 2025-08-05

## 2025-08-05 DIAGNOSIS — K29.70 GASTRITIS WITHOUT BLEEDING, UNSPECIFIED CHRONICITY, UNSPECIFIED GASTRITIS TYPE: Primary | ICD-10-CM

## 2025-08-25 ENCOUNTER — VIRTUAL PHONE E/M (OUTPATIENT)
Dept: UROLOGY | Facility: CLINIC | Age: 55
End: 2025-08-25
Attending: OBSTETRICS & GYNECOLOGY

## 2025-08-25 DIAGNOSIS — N95.2 POSTMENOPAUSAL ATROPHIC VAGINITIS: ICD-10-CM

## 2025-08-25 DIAGNOSIS — N39.0 FREQUENT UTI: Primary | ICD-10-CM

## 2025-08-25 DIAGNOSIS — N81.84 PELVIC MUSCLE WASTING: ICD-10-CM

## (undated) NOTE — MR AVS SNAPSHOT
After Visit Summary   2/1/2023    Jerry Mcbride   MRN: [id number]           Visit Information     Date & Time  2/1/2023  6:45 AM Provider  [facility]  [address], Virtual Visit Dept. Phone  [phone]      Your Vitals Were     LMP   03/01/2022 (Approximate)             Allergies as of 2/1/2023  Review status set to In Progress on 2/1/2023       Noted Reaction Type Reactions    Mastisol Adhesive 12/30/2021    RASH      Your Current Medications        Dosage    nitrofurantoin monohydrate macro 100 MG Oral Cap Take 1 tablet by mouth two times a day for 7 days    phenazopyridine 200 MG Oral Tab Take 1 tablet every 8 hours with food, as needed for urinary burning. Ibuprofen 200 MG Oral Cap Take 200 mg by mouth every 6 (six) hours. Ferrous Sulfate 324 (65 Fe) MG Oral Tab EC Take by mouth. ALPRAZolam 0.5 MG Oral Tab Take 0.5 mg by mouth nightly as needed. Diagnoses for This Visit    UTI symptoms   [166008]  -  Primary           We Ordered the Following     Normal Orders This Visit    OL DIG E/M SVC 5-10 MIN [75513 CPT(R)]       Future Appointments        Provider Department    6/2/2023 11:00 AM [facility], [address] [place] [address]                Did you know that [facility] primary care physicians now offer Video Visits through [address] for adult patients for a variety of conditions such as allergies, back pain and cold symptoms? Skip the drive and waiting room and online chat with a doctor face-to-face using your web-cam enabled computer or mobile device wherever you are. Video Visits cost $50 and can be paid hassle-free using a credit, debit, or health savings card. Not active on [employer]? Ask us how to get signed up today! If you receive a survey from [employer], please take a few minutes to complete it and provide feedback.  We strive to deliver the best patient experience and are looking for ways to make improvements. Your feedback will help us do so. For more information on [employer], please visit [email or web address]           No text in SmartText           No text in SmartText